# Patient Record
Sex: FEMALE | Race: WHITE | NOT HISPANIC OR LATINO | Employment: FULL TIME | URBAN - NONMETROPOLITAN AREA
[De-identification: names, ages, dates, MRNs, and addresses within clinical notes are randomized per-mention and may not be internally consistent; named-entity substitution may affect disease eponyms.]

---

## 2019-03-21 ENCOUNTER — HOSPITAL ENCOUNTER (OUTPATIENT)
Dept: MAMMOGRAPHY | Facility: HOSPITAL | Age: 39
Discharge: HOME | End: 2019-03-21
Admitting: NURSE PRACTITIONER

## 2019-03-21 ENCOUNTER — HOSPITAL ENCOUNTER (OUTPATIENT)
Dept: ULTRASOUND IMAGING | Facility: HOSPITAL | Age: 39
Discharge: HOME | End: 2019-03-21

## 2019-03-21 DIAGNOSIS — N63.10 LUMP OF RIGHT BREAST: ICD-10-CM

## 2019-03-21 PROCEDURE — 76642 ULTRASOUND BREAST LIMITED: CPT | Performed by: RADIOLOGY

## 2019-03-21 PROCEDURE — 76642 ULTRASOUND BREAST LIMITED: CPT

## 2019-03-21 PROCEDURE — G0279 TOMOSYNTHESIS, MAMMO: HCPCS

## 2019-03-21 PROCEDURE — 77066 DX MAMMO INCL CAD BI: CPT | Performed by: RADIOLOGY

## 2019-03-21 PROCEDURE — 77062 BREAST TOMOSYNTHESIS BI: CPT | Performed by: RADIOLOGY

## 2019-03-21 PROCEDURE — 77066 DX MAMMO INCL CAD BI: CPT

## 2021-01-21 ENCOUNTER — HOSPITAL ENCOUNTER (OUTPATIENT)
Dept: MAMMOGRAPHY | Facility: HOSPITAL | Age: 41
End: 2021-01-21

## 2022-02-11 ENCOUNTER — TELEPHONE (OUTPATIENT)
Dept: UROLOGY | Facility: CLINIC | Age: 42
End: 2022-02-11

## 2022-02-22 ENCOUNTER — OFFICE VISIT (OUTPATIENT)
Dept: GASTROENTEROLOGY | Facility: CLINIC | Age: 42
End: 2022-02-22

## 2022-02-22 VITALS — HEIGHT: 69 IN | WEIGHT: 289.8 LBS | BODY MASS INDEX: 42.92 KG/M2

## 2022-02-22 DIAGNOSIS — R11.0 NAUSEA: ICD-10-CM

## 2022-02-22 DIAGNOSIS — R10.13 EPIGASTRIC PAIN: ICD-10-CM

## 2022-02-22 DIAGNOSIS — R10.84 GENERALIZED ABDOMINAL PAIN: Primary | ICD-10-CM

## 2022-02-22 DIAGNOSIS — R19.7 DIARRHEA, UNSPECIFIED TYPE: ICD-10-CM

## 2022-02-22 PROCEDURE — 99204 OFFICE O/P NEW MOD 45 MIN: CPT | Performed by: PHYSICIAN ASSISTANT

## 2022-02-22 RX ORDER — DICYCLOMINE HYDROCHLORIDE 10 MG/1
10 CAPSULE ORAL
Qty: 120 CAPSULE | Refills: 0 | Status: SHIPPED | OUTPATIENT
Start: 2022-02-22 | End: 2022-06-01

## 2022-02-22 RX ORDER — BISACODYL 5 MG/1
20 TABLET, DELAYED RELEASE ORAL ONCE
Qty: 4 TABLET | Refills: 0 | Status: SHIPPED | OUTPATIENT
Start: 2022-02-22 | End: 2022-02-22

## 2022-02-22 RX ORDER — MAGNESIUM CARB/ALUMINUM HYDROX 105-160MG
296 TABLET,CHEWABLE ORAL TAKE AS DIRECTED
Qty: 592 ML | Refills: 0 | Status: SHIPPED | OUTPATIENT
Start: 2022-02-22 | End: 2022-03-08 | Stop reason: HOSPADM

## 2022-02-22 NOTE — PROGRESS NOTES
Chief Complaint   Patient presents with   • Diarrhea       Genevieve Ness is a 41 y.o. female who presents to the office today for evaluation of Diarrhea  .    HPI  Patient presents to the clinic today for evaluation of chronic diarrhea. Patient states that this has been going on for several months however back in January symptoms seem to increase dramatically. Back in the December she was placed on broad-spectrum antibiotics. They originally thought she had C. difficile however stool studies determined that was negative. Despite the result they treated her with Flagyl for 14 days. Patient states she noticed no difference whatsoever in taking the medication. Patient states that she has a lot of pain in the epigastric region after eating. After eating she will have a bowel movement within 5 minutes. Patient states she can go upwards of 20 times a day. She has lost in total 40 pounds however 20-30 of those pounds were lost in the last month. She has noticed some bright red blood per rectum however contributes that to hemorrhoids and straining. Patient states that her stools are type VII on the [place] stool scale and at times can appear like coffee grounds. She does have quite a bit of nausea throughout the day.  Review of Systems   Constitutional: Negative for fever.   HENT: Negative for trouble swallowing.    Eyes: Negative.    Respiratory: Negative for chest tightness.    Cardiovascular: Negative for chest pain.   Gastrointestinal: Positive for abdominal distention, abdominal pain, anal bleeding, blood in stool, diarrhea and nausea. Negative for constipation and vomiting.   Endocrine: Negative.    Genitourinary: Negative.    Musculoskeletal: Negative.    Skin: Negative.    Allergic/Immunologic: Negative.    Neurological: Positive for headaches.   Hematological: Does not bruise/bleed easily.   Psychiatric/Behavioral: Negative.        ACTIVE PROBLEMS:   Specialty Problems     None          PAST MEDICAL HISTORY:  Past  "Medical History:   Diagnosis Date   • Thyroid disease        SURGICAL HISTORY:  Past Surgical History:   Procedure Laterality Date   •  SECTION         FAMILY HISTORY:  Family History   Problem Relation Age of Onset   • Breast cancer Neg Hx        SOCIAL HISTORY:  Social History     Tobacco Use   • Smoking status: Former Smoker   • Smokeless tobacco: Never Used   Substance Use Topics   • Alcohol use: Never       CURRENT MEDICATION:    Current Outpatient Medications:   •  bisacodyl (DULCOLAX) 5 MG EC tablet, Take 4 tablets by mouth 1 (One) Time for 1 dose. Take 4 tablets at 4:00 PM the day before procedure, Disp: 4 tablet, Rfl: 0  •  dicyclomine (Bentyl) 10 MG capsule, Take 1 capsule by mouth 4 (Four) Times a Day Before Meals & at Bedtime., Disp: 120 capsule, Rfl: 0  •  magnesium citrate 1.745 GM/30ML solution solution, Take 296 mL by mouth Take As Directed for 2 doses. Take one full bottle at 4:00 PM and take second bottle at 10:00 PM., Disp: 592 mL, Rfl: 0    ALLERGIES:  Patient has no known allergies.    VISIT VITALS:  Ht 175.3 cm (69\")   Wt 131 kg (289 lb 12.8 oz)   BMI 42.80 kg/m²   Physical Exam  Constitutional:       General: She is not in acute distress.     Appearance: Normal appearance. She is well-developed.   HENT:      Head: Normocephalic and atraumatic.   Eyes:      Pupils: Pupils are equal, round, and reactive to light.   Cardiovascular:      Rate and Rhythm: Normal rate and regular rhythm.      Heart sounds: Normal heart sounds.   Pulmonary:      Effort: Pulmonary effort is normal. No respiratory distress.      Breath sounds: Normal breath sounds. No wheezing, rhonchi or rales.   Abdominal:      General: Abdomen is flat. Bowel sounds are normal. There is no distension.      Palpations: Abdomen is soft. There is no mass.      Tenderness: There is abdominal tenderness. There is no guarding or rebound.      Hernia: No hernia is present.   Musculoskeletal:         General: No swelling. Normal " range of motion.      Cervical back: Normal range of motion and neck supple.      Right lower leg: No edema.      Left lower leg: No edema.   Skin:     General: Skin is warm and dry.   Neurological:      Mental Status: She is alert and oriented to person, place, and time.   Psychiatric:         Attention and Perception: Attention normal.         Mood and Affect: Mood normal.         Speech: Speech normal.         Behavior: Behavior normal. Behavior is cooperative.         Thought Content: Thought content normal.         Assessment/Plan   Due to the patient's symptoms-I believe we need to start with an EGD/colonoscopy to evaluate causes behind the gastric pain as well as diarrhea. In the meantime while waiting on scopes I will get the patient started on Bentyl 10 mg 4 times daily. Both procedures were thoroughly explained to the patient she voiced understanding and agrees to treatment plan   Diagnosis Plan   1. Generalized abdominal pain  Case Request    Case Request    bisacodyl (DULCOLAX) 5 MG EC tablet    magnesium citrate 1.745 GM/30ML solution solution    dicyclomine (Bentyl) 10 MG capsule   2. Epigastric pain  Case Request    Case Request    bisacodyl (DULCOLAX) 5 MG EC tablet    magnesium citrate 1.745 GM/30ML solution solution   3. Diarrhea, unspecified type  Case Request    Case Request    bisacodyl (DULCOLAX) 5 MG EC tablet    magnesium citrate 1.745 GM/30ML solution solution   4. Nausea         Return After procedure.                   This document has been electronically signed by Charlene Addison PA-C  February 22, 2022 14:57 EST    Part of this note may be an electronic transcription/translation of spoken language to printed text using the Dragon Dictation System.

## 2022-02-23 DIAGNOSIS — R10.84 GENERALIZED ABDOMINAL PAIN: ICD-10-CM

## 2022-02-23 DIAGNOSIS — R11.0 NAUSEA: ICD-10-CM

## 2022-02-23 DIAGNOSIS — R10.13 EPIGASTRIC PAIN: Primary | ICD-10-CM

## 2022-02-23 DIAGNOSIS — R19.7 DIARRHEA, UNSPECIFIED TYPE: ICD-10-CM

## 2022-02-24 ENCOUNTER — TELEPHONE (OUTPATIENT)
Dept: GASTROENTEROLOGY | Facility: CLINIC | Age: 42
End: 2022-02-24

## 2022-02-28 ENCOUNTER — TELEPHONE (OUTPATIENT)
Dept: GASTROENTEROLOGY | Facility: CLINIC | Age: 42
End: 2022-02-28

## 2022-03-01 PROBLEM — R10.84 GENERALIZED ABDOMINAL PAIN: Status: ACTIVE | Noted: 2022-03-01

## 2022-03-01 PROBLEM — R10.13 EPIGASTRIC PAIN: Status: ACTIVE | Noted: 2022-03-01

## 2022-03-01 PROBLEM — R19.7 DIARRHEA: Status: ACTIVE | Noted: 2022-03-01

## 2022-03-04 ENCOUNTER — APPOINTMENT (OUTPATIENT)
Dept: MAMMOGRAPHY | Facility: HOSPITAL | Age: 42
End: 2022-03-04

## 2022-03-04 ENCOUNTER — LAB (OUTPATIENT)
Dept: LAB | Facility: HOSPITAL | Age: 42
End: 2022-03-04

## 2022-03-04 DIAGNOSIS — R19.7 DIARRHEA, UNSPECIFIED TYPE: ICD-10-CM

## 2022-03-04 DIAGNOSIS — R10.84 GENERALIZED ABDOMINAL PAIN: ICD-10-CM

## 2022-03-04 DIAGNOSIS — R11.0 NAUSEA: ICD-10-CM

## 2022-03-04 DIAGNOSIS — R10.13 EPIGASTRIC PAIN: ICD-10-CM

## 2022-03-04 PROCEDURE — C9803 HOPD COVID-19 SPEC COLLECT: HCPCS

## 2022-03-04 PROCEDURE — U0004 COV-19 TEST NON-CDC HGH THRU: HCPCS | Performed by: PHYSICIAN ASSISTANT

## 2022-03-05 LAB — SARS-COV-2 RNA PNL SPEC NAA+PROBE: NOT DETECTED

## 2022-03-08 ENCOUNTER — ANESTHESIA (OUTPATIENT)
Dept: PERIOP | Facility: HOSPITAL | Age: 42
End: 2022-03-08

## 2022-03-08 ENCOUNTER — ANESTHESIA EVENT (OUTPATIENT)
Dept: PERIOP | Facility: HOSPITAL | Age: 42
End: 2022-03-08

## 2022-03-08 ENCOUNTER — HOSPITAL ENCOUNTER (OUTPATIENT)
Facility: HOSPITAL | Age: 42
Setting detail: HOSPITAL OUTPATIENT SURGERY
Discharge: HOME | End: 2022-03-08
Attending: INTERNAL MEDICINE | Admitting: INTERNAL MEDICINE

## 2022-03-08 VITALS
DIASTOLIC BLOOD PRESSURE: 91 MMHG | HEIGHT: 69 IN | TEMPERATURE: 98 F | BODY MASS INDEX: 42.06 KG/M2 | RESPIRATION RATE: 16 BRPM | OXYGEN SATURATION: 100 % | HEART RATE: 77 BPM | SYSTOLIC BLOOD PRESSURE: 131 MMHG | WEIGHT: 284 LBS

## 2022-03-08 DIAGNOSIS — R19.7 DIARRHEA, UNSPECIFIED TYPE: ICD-10-CM

## 2022-03-08 DIAGNOSIS — K51.011 ULCERATIVE PANCOLITIS WITH RECTAL BLEEDING: Primary | ICD-10-CM

## 2022-03-08 DIAGNOSIS — R10.84 GENERALIZED ABDOMINAL PAIN: ICD-10-CM

## 2022-03-08 DIAGNOSIS — R10.13 EPIGASTRIC PAIN: ICD-10-CM

## 2022-03-08 LAB
B-HCG UR QL: NEGATIVE
EXPIRATION DATE: NORMAL
INTERNAL NEGATIVE CONTROL: NEGATIVE
INTERNAL POSITIVE CONTROL: POSITIVE
Lab: NORMAL

## 2022-03-08 PROCEDURE — 84466 ASSAY OF TRANSFERRIN: CPT | Performed by: INTERNAL MEDICINE

## 2022-03-08 PROCEDURE — 83540 ASSAY OF IRON: CPT | Performed by: INTERNAL MEDICINE

## 2022-03-08 PROCEDURE — 25010000002 PROPOFOL 10 MG/ML EMULSION: Performed by: NURSE ANESTHETIST, CERTIFIED REGISTERED

## 2022-03-08 PROCEDURE — 86140 C-REACTIVE PROTEIN: CPT | Performed by: INTERNAL MEDICINE

## 2022-03-08 PROCEDURE — 45380 COLONOSCOPY AND BIOPSY: CPT | Performed by: INTERNAL MEDICINE

## 2022-03-08 PROCEDURE — 85007 BL SMEAR W/DIFF WBC COUNT: CPT | Performed by: INTERNAL MEDICINE

## 2022-03-08 PROCEDURE — 85025 COMPLETE CBC W/AUTO DIFF WBC: CPT | Performed by: INTERNAL MEDICINE

## 2022-03-08 PROCEDURE — 43239 EGD BIOPSY SINGLE/MULTIPLE: CPT | Performed by: INTERNAL MEDICINE

## 2022-03-08 PROCEDURE — 81025 URINE PREGNANCY TEST: CPT | Performed by: ANESTHESIOLOGY

## 2022-03-08 PROCEDURE — 25010000002 FENTANYL CITRATE (PF) 50 MCG/ML SOLUTION: Performed by: NURSE ANESTHETIST, CERTIFIED REGISTERED

## 2022-03-08 PROCEDURE — 80053 COMPREHEN METABOLIC PANEL: CPT | Performed by: INTERNAL MEDICINE

## 2022-03-08 PROCEDURE — 25010000002 KETOROLAC TROMETHAMINE PER 15 MG: Performed by: NURSE ANESTHETIST, CERTIFIED REGISTERED

## 2022-03-08 PROCEDURE — 36415 COLL VENOUS BLD VENIPUNCTURE: CPT | Performed by: INTERNAL MEDICINE

## 2022-03-08 RX ORDER — OXYCODONE HYDROCHLORIDE AND ACETAMINOPHEN 5; 325 MG/1; MG/1
1 TABLET ORAL ONCE AS NEEDED
Status: DISCONTINUED | OUTPATIENT
Start: 2022-03-08 | End: 2022-03-08 | Stop reason: HOSPADM

## 2022-03-08 RX ORDER — DROPERIDOL 2.5 MG/ML
0.62 INJECTION, SOLUTION INTRAMUSCULAR; INTRAVENOUS ONCE AS NEEDED
Status: DISCONTINUED | OUTPATIENT
Start: 2022-03-08 | End: 2022-03-08 | Stop reason: HOSPADM

## 2022-03-08 RX ORDER — PREDNISONE 1 MG/1
TABLET ORAL
Qty: 109 TABLET | Refills: 0 | Status: SHIPPED | OUTPATIENT
Start: 2022-03-08 | End: 2022-03-31 | Stop reason: SDUPTHER

## 2022-03-08 RX ORDER — KETOROLAC TROMETHAMINE 30 MG/ML
30 INJECTION, SOLUTION INTRAMUSCULAR; INTRAVENOUS EVERY 6 HOURS PRN
Status: DISCONTINUED | OUTPATIENT
Start: 2022-03-08 | End: 2022-03-08 | Stop reason: HOSPADM

## 2022-03-08 RX ORDER — MESALAMINE 4 G/60ML
4 ENEMA RECTAL SEE ADMIN INSTRUCTIONS
Qty: 30 ENEMA | Refills: 2 | Status: SHIPPED | OUTPATIENT
Start: 2022-03-08 | End: 2022-12-21

## 2022-03-08 RX ORDER — IPRATROPIUM BROMIDE AND ALBUTEROL SULFATE 2.5; .5 MG/3ML; MG/3ML
3 SOLUTION RESPIRATORY (INHALATION) ONCE AS NEEDED
Status: DISCONTINUED | OUTPATIENT
Start: 2022-03-08 | End: 2022-03-08 | Stop reason: HOSPADM

## 2022-03-08 RX ORDER — ONDANSETRON 2 MG/ML
4 INJECTION INTRAMUSCULAR; INTRAVENOUS AS NEEDED
Status: DISCONTINUED | OUTPATIENT
Start: 2022-03-08 | End: 2022-03-08 | Stop reason: HOSPADM

## 2022-03-08 RX ORDER — SODIUM CHLORIDE, SODIUM LACTATE, POTASSIUM CHLORIDE, CALCIUM CHLORIDE 600; 310; 30; 20 MG/100ML; MG/100ML; MG/100ML; MG/100ML
INJECTION, SOLUTION INTRAVENOUS CONTINUOUS PRN
Status: DISCONTINUED | OUTPATIENT
Start: 2022-03-08 | End: 2022-03-08 | Stop reason: SURG

## 2022-03-08 RX ORDER — MEPERIDINE HYDROCHLORIDE 25 MG/ML
12.5 INJECTION INTRAMUSCULAR; INTRAVENOUS; SUBCUTANEOUS
Status: DISCONTINUED | OUTPATIENT
Start: 2022-03-08 | End: 2022-03-08 | Stop reason: HOSPADM

## 2022-03-08 RX ORDER — MESALAMINE 1.2 G/1
TABLET, DELAYED RELEASE ORAL
Qty: 120 TABLET | Refills: 11 | Status: SHIPPED | OUTPATIENT
Start: 2022-03-08 | End: 2023-03-31

## 2022-03-08 RX ORDER — LIDOCAINE HYDROCHLORIDE 20 MG/ML
INJECTION, SOLUTION INFILTRATION; PERINEURAL AS NEEDED
Status: DISCONTINUED | OUTPATIENT
Start: 2022-03-08 | End: 2022-03-08 | Stop reason: SURG

## 2022-03-08 RX ORDER — SODIUM CHLORIDE, SODIUM LACTATE, POTASSIUM CHLORIDE, CALCIUM CHLORIDE 600; 310; 30; 20 MG/100ML; MG/100ML; MG/100ML; MG/100ML
100 INJECTION, SOLUTION INTRAVENOUS ONCE AS NEEDED
Status: DISCONTINUED | OUTPATIENT
Start: 2022-03-08 | End: 2022-03-08 | Stop reason: HOSPADM

## 2022-03-08 RX ORDER — PROPOFOL 10 MG/ML
VIAL (ML) INTRAVENOUS AS NEEDED
Status: DISCONTINUED | OUTPATIENT
Start: 2022-03-08 | End: 2022-03-08 | Stop reason: SURG

## 2022-03-08 RX ORDER — FENTANYL CITRATE 50 UG/ML
50 INJECTION, SOLUTION INTRAMUSCULAR; INTRAVENOUS
Status: DISCONTINUED | OUTPATIENT
Start: 2022-03-08 | End: 2022-03-08 | Stop reason: HOSPADM

## 2022-03-08 RX ADMIN — PROPOFOL 200 MCG/KG/MIN: 10 INJECTION, EMULSION INTRAVENOUS at 12:36

## 2022-03-08 RX ADMIN — SODIUM CHLORIDE, POTASSIUM CHLORIDE, SODIUM LACTATE AND CALCIUM CHLORIDE: 600; 310; 30; 20 INJECTION, SOLUTION INTRAVENOUS at 12:36

## 2022-03-08 RX ADMIN — FENTANYL CITRATE 50 MCG: 50 INJECTION INTRAMUSCULAR; INTRAVENOUS at 13:19

## 2022-03-08 RX ADMIN — LIDOCAINE HYDROCHLORIDE 60 MG: 20 INJECTION, SOLUTION INFILTRATION; PERINEURAL at 12:36

## 2022-03-08 RX ADMIN — PROPOFOL 60 MG: 10 INJECTION, EMULSION INTRAVENOUS at 12:36

## 2022-03-08 NOTE — ANESTHESIA PREPROCEDURE EVALUATION
Anesthesia Evaluation     Patient summary reviewed and Nursing notes reviewed   no history of anesthetic complications:  NPO Solid Status: > 8 hours  NPO Liquid Status: > 8 hours           Airway   Mallampati: III  TM distance: >3 FB  Neck ROM: full  Possible difficult intubation  Dental - normal exam     Pulmonary - normal exam    breath sounds clear to auscultation  (+) a smoker (vap) Current Abstained day of surgery,   Cardiovascular - negative cardio ROS and normal exam    Rhythm: regular  Rate: normal        Neuro/Psych- negative ROS  GI/Hepatic/Renal/Endo    (+) obesity, morbid obesity,  thyroid problem     Musculoskeletal (-) negative ROS    Abdominal   (+) obese,    Substance History - negative use     OB/GYN negative ob/gyn ROS         Other - negative ROS           Phys Exam Other: Mr. Ness acts very sleepy and slightly slurs her speech.  She denies any drug use.                 Anesthesia Plan    ASA 3     general   total IV anesthesia(Classs III morbid obesity. )  intravenous induction     Anesthetic plan, all risks, benefits, and alternatives have been provided, discussed and informed consent has been obtained with: patient.    Plan discussed with CRNA.        CODE STATUS:

## 2022-03-08 NOTE — ANESTHESIA POSTPROCEDURE EVALUATION
Patient: Genevieve Ness    Procedure Summary     Date: 03/08/22 Room / Location:  [facility]  /  [facility]    Anesthesia Start: 1232 Anesthesia Stop: 1304    Procedures:       ESOPHAGOGASTRODUODENOSCOPY WITH BIOPSY (N/A Esophagus)      COLONOSCOPY FOR SCREENING (N/A ) Diagnosis:       Epigastric pain      Diarrhea, unspecified type      Generalized abdominal pain      (Epigastric pain [R10.13])      (Diarrhea, unspecified type [R19.7])      (Generalized abdominal pain [R10.84])    Surgeons: Vera Lawton MD Provider: Lan Stover DO    Anesthesia Type: general ASA Status: 3          Anesthesia Type: general    Vitals  Vitals Value Taken Time   /91 03/08/22 1349   Temp 98 °F (36.7 °C) 03/08/22 1306   Pulse 77 03/08/22 1349   Resp 16 03/08/22 1349   SpO2 100 % 03/08/22 1349           Post Anesthesia Care and Evaluation    Patient location during evaluation: PHASE II  Patient participation: complete - patient participated  Level of consciousness: awake and alert  Pain score: 0  Pain management: adequate  Airway patency: patent  Anesthetic complications: No anesthetic complications  PONV Status: controlled  Cardiovascular status: acceptable  Respiratory status: acceptable and room air  Hydration status: euvolemic  No anesthesia care post op

## 2022-03-09 PROBLEM — D50.0 IRON DEFICIENCY ANEMIA DUE TO CHRONIC BLOOD LOSS: Status: ACTIVE | Noted: 2022-03-09

## 2022-03-09 PROBLEM — K90.9 IRON MALABSORPTION: Status: ACTIVE | Noted: 2022-03-09

## 2022-03-09 LAB
ALBUMIN SERPL-MCNC: 3.1 G/DL (ref 3.5–5.2)
ALBUMIN/GLOB SERPL: 0.9 G/DL
ALP SERPL-CCNC: 69 U/L (ref 39–117)
ALT SERPL W P-5'-P-CCNC: 30 U/L (ref 1–33)
ANION GAP SERPL CALCULATED.3IONS-SCNC: 12.9 MMOL/L (ref 5–15)
ANISOCYTOSIS BLD QL: NORMAL
AST SERPL-CCNC: 28 U/L (ref 1–32)
BILIRUB SERPL-MCNC: 0.2 MG/DL (ref 0–1.2)
BUN SERPL-MCNC: 7 MG/DL (ref 6–20)
BUN/CREAT SERPL: 8 (ref 7–25)
CALCIUM SPEC-SCNC: 8.6 MG/DL (ref 8.6–10.5)
CHLORIDE SERPL-SCNC: 102 MMOL/L (ref 98–107)
CO2 SERPL-SCNC: 23.1 MMOL/L (ref 22–29)
CREAT SERPL-MCNC: 0.87 MG/DL (ref 0.57–1)
CRP SERPL-MCNC: 2.64 MG/DL (ref 0–0.5)
DEPRECATED RDW RBC AUTO: 56.6 FL (ref 37–54)
EGFRCR SERPLBLD CKD-EPI 2021: 86 ML/MIN/1.73
EOSINOPHIL # BLD MANUAL: 0.18 10*3/MM3 (ref 0–0.4)
EOSINOPHIL NFR BLD MANUAL: 3.3 % (ref 0.3–6.2)
ERYTHROCYTE [DISTWIDTH] IN BLOOD BY AUTOMATED COUNT: 18.2 % (ref 12.3–15.4)
GLOBULIN UR ELPH-MCNC: 3.6 GM/DL
GLUCOSE SERPL-MCNC: 99 MG/DL (ref 65–99)
HCT VFR BLD AUTO: 31.2 % (ref 34–46.6)
HGB BLD-MCNC: 9.3 G/DL (ref 12–15.9)
HYPOCHROMIA BLD QL: NORMAL
IRON 24H UR-MRATE: 16 MCG/DL (ref 37–145)
IRON SATN MFR SERPL: 5 % (ref 20–50)
LYMPHOCYTES # BLD MANUAL: 1.3 10*3/MM3 (ref 0.7–3.1)
LYMPHOCYTES NFR BLD MANUAL: 6.5 % (ref 5–12)
MCH RBC QN AUTO: 25.5 PG (ref 26.6–33)
MCHC RBC AUTO-ENTMCNC: 29.8 G/DL (ref 31.5–35.7)
MCV RBC AUTO: 85.5 FL (ref 79–97)
MICROCYTES BLD QL: NORMAL
MONOCYTES # BLD: 0.35 10*3/MM3 (ref 0.1–0.9)
NEUTROPHILS # BLD AUTO: 3.61 10*3/MM3 (ref 1.7–7)
NEUTROPHILS NFR BLD MANUAL: 66.3 % (ref 42.7–76)
NRBC BLD AUTO-RTO: 0 /100 WBC (ref 0–0.2)
PLAT MORPH BLD: NORMAL
PLATELET # BLD AUTO: 328 10*3/MM3 (ref 140–450)
PMV BLD AUTO: 11.9 FL (ref 6–12)
POTASSIUM SERPL-SCNC: 4.2 MMOL/L (ref 3.5–5.2)
PROT SERPL-MCNC: 6.7 G/DL (ref 6–8.5)
RBC # BLD AUTO: 3.65 10*6/MM3 (ref 3.77–5.28)
SODIUM SERPL-SCNC: 138 MMOL/L (ref 136–145)
TIBC SERPL-MCNC: 334 MCG/DL (ref 298–536)
TRANSFERRIN SERPL-MCNC: 224 MG/DL (ref 200–360)
VARIANT LYMPHS NFR BLD MANUAL: 2.2 % (ref 0–5)
VARIANT LYMPHS NFR BLD MANUAL: 21.7 % (ref 19.6–45.3)
WBC MORPH BLD: NORMAL
WBC NRBC COR # BLD: 5.45 10*3/MM3 (ref 3.4–10.8)

## 2022-03-09 NOTE — PROGRESS NOTES
Yesterday I drew labs to check for iron deficiency anemia due to your history of rectal bleeding.  You are very iron deficient.  I am going to arrange for IV iron replacement.  This should make you feel much better.  I am going to give IV infusions due to your high intolerance of oral iron.

## 2022-03-11 LAB
LAB AP CASE REPORT: NORMAL
PATH REPORT.FINAL DX SPEC: NORMAL

## 2022-03-14 ENCOUNTER — INFUSION (OUTPATIENT)
Dept: ONCOLOGY | Facility: HOSPITAL | Age: 42
End: 2022-03-14

## 2022-03-14 VITALS
HEART RATE: 76 BPM | RESPIRATION RATE: 18 BRPM | SYSTOLIC BLOOD PRESSURE: 137 MMHG | DIASTOLIC BLOOD PRESSURE: 85 MMHG | BODY MASS INDEX: 43.55 KG/M2 | OXYGEN SATURATION: 100 % | TEMPERATURE: 97.3 F | WEIGHT: 293 LBS

## 2022-03-14 DIAGNOSIS — D50.0 IRON DEFICIENCY ANEMIA DUE TO CHRONIC BLOOD LOSS: Primary | ICD-10-CM

## 2022-03-14 DIAGNOSIS — K90.9 IRON MALABSORPTION: ICD-10-CM

## 2022-03-14 PROCEDURE — 96365 THER/PROPH/DIAG IV INF INIT: CPT

## 2022-03-14 PROCEDURE — 96366 THER/PROPH/DIAG IV INF ADDON: CPT

## 2022-03-14 PROCEDURE — 25010000002 IRON SUCROSE PER 1 MG: Performed by: INTERNAL MEDICINE

## 2022-03-14 RX ADMIN — IRON SUCROSE 300 MG: 20 INJECTION, SOLUTION INTRAVENOUS at 11:45

## 2022-03-15 ENCOUNTER — INFUSION (OUTPATIENT)
Dept: ONCOLOGY | Facility: HOSPITAL | Age: 42
End: 2022-03-15

## 2022-03-15 VITALS
RESPIRATION RATE: 18 BRPM | TEMPERATURE: 98.2 F | DIASTOLIC BLOOD PRESSURE: 71 MMHG | BODY MASS INDEX: 43.12 KG/M2 | WEIGHT: 292 LBS | HEART RATE: 93 BPM | OXYGEN SATURATION: 99 % | SYSTOLIC BLOOD PRESSURE: 128 MMHG

## 2022-03-15 DIAGNOSIS — K90.9 IRON MALABSORPTION: ICD-10-CM

## 2022-03-15 DIAGNOSIS — D50.0 IRON DEFICIENCY ANEMIA DUE TO CHRONIC BLOOD LOSS: Primary | ICD-10-CM

## 2022-03-15 PROBLEM — K51.90 ULCERATIVE COLITIS: Status: ACTIVE | Noted: 2022-03-15

## 2022-03-15 PROCEDURE — 96365 THER/PROPH/DIAG IV INF INIT: CPT

## 2022-03-15 PROCEDURE — 25010000002 IRON SUCROSE PER 1 MG: Performed by: INTERNAL MEDICINE

## 2022-03-15 RX ADMIN — IRON SUCROSE 300 MG: 20 INJECTION, SOLUTION INTRAVENOUS at 15:24

## 2022-03-15 NOTE — PROGRESS NOTES
At the time of your recent colonoscopy, biopsies were taken randomly of the colon.  Biopsies revealed chronic active colitis.  We have started you on medication for this.  You also had a polyp that because of the amount of inflammation in your colon I could not tell if it was an inflammatory polyp versus a precancerous polyp.  Biopsies revealed this to be a precancerous polyp.  We will repeat your colonoscopy before the end of the year to remove that polyp.  However, I would like to get your ulcerative colitis under good control prior to doing so.  I will set you up for colonoscopy for the end of summer.  Biopsies of the esophagus obtained during your upper endoscopy revealed reflux esophagitis.  However, this may be secondary to the persistent nausea and vomiting that you experienced due to colitis.  We will hold off on putting you on a medication for this at this time to see how you do with treatment of the ulcerative colitis.  Please keep your follow-up appointments.

## 2022-03-16 ENCOUNTER — INFUSION (OUTPATIENT)
Dept: ONCOLOGY | Facility: HOSPITAL | Age: 42
End: 2022-03-16

## 2022-03-16 VITALS
BODY MASS INDEX: 43.71 KG/M2 | HEART RATE: 75 BPM | SYSTOLIC BLOOD PRESSURE: 123 MMHG | WEIGHT: 293 LBS | DIASTOLIC BLOOD PRESSURE: 76 MMHG | TEMPERATURE: 97.2 F | OXYGEN SATURATION: 99 % | RESPIRATION RATE: 18 BRPM

## 2022-03-16 DIAGNOSIS — D50.0 IRON DEFICIENCY ANEMIA DUE TO CHRONIC BLOOD LOSS: Primary | ICD-10-CM

## 2022-03-16 DIAGNOSIS — K90.9 IRON MALABSORPTION: ICD-10-CM

## 2022-03-16 PROCEDURE — 96365 THER/PROPH/DIAG IV INF INIT: CPT

## 2022-03-16 PROCEDURE — 96366 THER/PROPH/DIAG IV INF ADDON: CPT

## 2022-03-16 PROCEDURE — 25010000002 IRON SUCROSE PER 1 MG: Performed by: INTERNAL MEDICINE

## 2022-03-16 RX ADMIN — IRON SUCROSE 300 MG: 20 INJECTION, SOLUTION INTRAVENOUS at 08:26

## 2022-03-29 ENCOUNTER — SPECIALTY PHARMACY (OUTPATIENT)
Dept: PHARMACY | Facility: HOSPITAL | Age: 42
End: 2022-03-29

## 2022-03-29 RX ORDER — ADALIMUMAB 40MG/0.4ML
40 KIT SUBCUTANEOUS
Qty: 2 EACH | Refills: 4 | Status: SHIPPED | OUTPATIENT
Start: 2022-03-29 | End: 2022-06-01

## 2022-03-31 ENCOUNTER — OFFICE VISIT (OUTPATIENT)
Dept: GASTROENTEROLOGY | Facility: CLINIC | Age: 42
End: 2022-03-31

## 2022-03-31 VITALS
WEIGHT: 291 LBS | HEIGHT: 69 IN | DIASTOLIC BLOOD PRESSURE: 87 MMHG | OXYGEN SATURATION: 98 % | BODY MASS INDEX: 43.1 KG/M2 | HEART RATE: 81 BPM | SYSTOLIC BLOOD PRESSURE: 141 MMHG

## 2022-03-31 DIAGNOSIS — K51.919 ULCERATIVE COLITIS WITH COMPLICATION, UNSPECIFIED LOCATION: Primary | ICD-10-CM

## 2022-03-31 PROCEDURE — 99213 OFFICE O/P EST LOW 20 MIN: CPT | Performed by: PHYSICIAN ASSISTANT

## 2022-03-31 RX ORDER — PREDNISONE 10 MG/1
TABLET ORAL
Qty: 7 TABLET | Refills: 0 | Status: SHIPPED | OUTPATIENT
Start: 2022-03-31 | End: 2022-04-26

## 2022-04-04 ENCOUNTER — TELEPHONE (OUTPATIENT)
Dept: UROLOGY | Facility: CLINIC | Age: 42
End: 2022-04-04

## 2022-04-04 NOTE — TELEPHONE ENCOUNTER
The patient called and said her Humira has been approved and she wanted to know if that was okay for her to go ahead and take it. It was denied at first and was working on Entyvio.

## 2022-04-05 ENCOUNTER — TELEPHONE (OUTPATIENT)
Dept: GASTROENTEROLOGY | Facility: CLINIC | Age: 42
End: 2022-04-05

## 2022-04-05 NOTE — TELEPHONE ENCOUNTER
Called and spoke to patient, it is ok to start the Humira on day 1 2 x 80mg shots, day 15 1 x 80mg shot day 30 and after 40mg shot, I told patient once she receives the medication if she wants to call her and I will go over the instructions and see if they are the same as the box she could

## 2022-04-13 ENCOUNTER — TELEPHONE (OUTPATIENT)
Dept: GASTROENTEROLOGY | Facility: CLINIC | Age: 42
End: 2022-04-13

## 2022-04-25 ENCOUNTER — TELEPHONE (OUTPATIENT)
Dept: GASTROENTEROLOGY | Facility: CLINIC | Age: 42
End: 2022-04-25

## 2022-04-25 NOTE — TELEPHONE ENCOUNTER
Patient said she can not tell if humira is working, but after coming off of steroid she is worse. She has been vomiting with diarrhea all weekend. She wanting to know if she can try something else.

## 2022-04-26 ENCOUNTER — LAB (OUTPATIENT)
Dept: LAB | Facility: HOSPITAL | Age: 42
End: 2022-04-26

## 2022-04-26 ENCOUNTER — OFFICE VISIT (OUTPATIENT)
Dept: GASTROENTEROLOGY | Facility: CLINIC | Age: 42
End: 2022-04-26

## 2022-04-26 VITALS
BODY MASS INDEX: 42.21 KG/M2 | SYSTOLIC BLOOD PRESSURE: 166 MMHG | OXYGEN SATURATION: 96 % | WEIGHT: 285 LBS | DIASTOLIC BLOOD PRESSURE: 95 MMHG | HEART RATE: 78 BPM | HEIGHT: 69 IN

## 2022-04-26 DIAGNOSIS — K51.919 ULCERATIVE COLITIS WITH COMPLICATION, UNSPECIFIED LOCATION: ICD-10-CM

## 2022-04-26 DIAGNOSIS — K51.919 ULCERATIVE COLITIS WITH COMPLICATION, UNSPECIFIED LOCATION: Primary | ICD-10-CM

## 2022-04-26 PROCEDURE — 83993 ASSAY FOR CALPROTECTIN FECAL: CPT

## 2022-04-26 PROCEDURE — 99214 OFFICE O/P EST MOD 30 MIN: CPT | Performed by: PHYSICIAN ASSISTANT

## 2022-04-26 RX ORDER — PREDNISONE 10 MG/1
10 TABLET ORAL TAKE AS DIRECTED
Qty: 73 TABLET | Refills: 0 | Status: SHIPPED | OUTPATIENT
Start: 2022-04-26 | End: 2022-06-27 | Stop reason: SDUPTHER

## 2022-04-26 NOTE — PROGRESS NOTES
Chief Complaint   Patient presents with   • Ulcerative Colitis       Genevieve Ness is a 41 y.o. female who presents to the office today for evaluation of Ulcerative Colitis  .    HPI  Patient presents the clinic today for follow-up of ulcerative colitis.  Patient has not been seen in clinic since she started her Humira-she has taken all of her loading doses and has not initiated 40 mg biweekly.  Patient however has noted a significant increase/worsening in symptoms.  She is having severe abdominal pain that occurs almost constantly throughout the day without any alleviating or aggravating factors.  She is also experiencing an increase in diarrhea as well that she has had 12-14 bowel movements already this morning.  Most of her bowel movements are bloody in nature.  Since the severe abdominal pain has started patient has also been having vomiting episodes periodically.  She feels like she is only getting worse since starting the Humira.  She had previously been doing well while on a steroid taper pack.  She is roughly 6 weeks into treatment with Humira and has had no symptom resolution.  Patient states that she just feels extremely sick and not well.  At times it is hard for her to even do normal daily activities due to the pain.    Review of Systems   Constitutional: Negative for fever.   HENT: Negative for trouble swallowing.    Eyes: Negative.    Respiratory: Negative for chest tightness.    Cardiovascular: Negative for chest pain.   Gastrointestinal: Positive for abdominal distention, abdominal pain, anal bleeding, blood in stool, diarrhea and nausea. Negative for constipation and vomiting.   Endocrine: Negative.    Genitourinary: Negative.    Musculoskeletal: Negative.    Skin: Negative.    Allergic/Immunologic: Negative.    Neurological: Positive for headaches.   Hematological: Does not bruise/bleed easily.   Psychiatric/Behavioral: Negative.        ACTIVE PROBLEMS:   Specialty Problems        Gastroenterology  Problems    Iron malabsorption        Ulcerative colitis (HCC)              PAST MEDICAL HISTORY:  Past Medical History:   Diagnosis Date   • Thyroid disease        SURGICAL HISTORY:  Past Surgical History:   Procedure Laterality Date   •  SECTION     • COLONOSCOPY N/A 3/8/2022    Procedure: COLONOSCOPY FOR SCREENING;  Surgeon: Vera Lawton MD;  Location: [facility];  Service: Gastroenterology;  Laterality: N/A;   • ENDOSCOPY N/A 3/8/2022    Procedure: ESOPHAGOGASTRODUODENOSCOPY WITH BIOPSY;  Surgeon: Vera Lawton MD;  Location: [facility];  Service: Gastroenterology;  Laterality: N/A;   • WISDOM TOOTH EXTRACTION         FAMILY HISTORY:  Family History   Problem Relation Age of Onset   • Breast cancer Neg Hx        SOCIAL HISTORY:  Social History     Tobacco Use   • Smoking status: Former Smoker   • Smokeless tobacco: Never Used   Substance Use Topics   • Alcohol use: Never       CURRENT MEDICATION:    Current Outpatient Medications:   •  Adalimumab (Humira Pen) 40 MG/0.4ML Pen-injector Kit, Inject 40 mg under the skin into the appropriate area as directed Every 14 (Fourteen) Days., Disp: 2 each, Rfl: 4  •  Adalimumab (HUMIRA) 80 MG/0.8ML injection, Inject 1.6 mL under the skin into the appropriate area as directed Take As Directed. Once, then 0.8 mL 14 days later, Disp: 2.4 mL, Rfl: 0  •  dicyclomine (Bentyl) 10 MG capsule, Take 1 capsule by mouth 4 (Four) Times a Day Before Meals & at Bedtime., Disp: 120 capsule, Rfl: 0  •  mesalamine (Lialda) 1.2 g EC tablet, Take four tablets by mouth all at one time in the morning, Disp: 120 tablet, Rfl: 11  •  mesalamine (ROWASA) 4 g enema, Insert 1 enema into the rectum See Admin Instructions. One enema in rectum nightly, try and retain overnight, Disp: 30 enema, Rfl: 2  •  predniSONE (DELTASONE) 10 MG tablet, Take 1 tablet by mouth Take As Directed. Take 40mg x 7 days, 30mg x 7 days, 20mg x 7 days, 10mg x 7 days, and 5mg x 6 days, Disp:  "73 tablet, Rfl: 0    ALLERGIES:  Patient has no known allergies.    VISIT VITALS:  /95   Pulse 78   Ht 175.3 cm (69\")   Wt 129 kg (285 lb)   SpO2 96%   BMI 42.09 kg/m²   Physical Exam  Constitutional:       General: She is not in acute distress.     Appearance: Normal appearance. She is well-developed.   HENT:      Head: Normocephalic and atraumatic.   Eyes:      Pupils: Pupils are equal, round, and reactive to light.   Cardiovascular:      Rate and Rhythm: Normal rate and regular rhythm.      Heart sounds: Normal heart sounds.   Pulmonary:      Effort: Pulmonary effort is normal. No respiratory distress.      Breath sounds: Normal breath sounds. No wheezing, rhonchi or rales.   Abdominal:      General: Abdomen is flat. Bowel sounds are normal. There is no distension.      Palpations: Abdomen is soft. There is no mass.      Tenderness: There is no abdominal tenderness. There is no guarding or rebound.      Hernia: No hernia is present.   Musculoskeletal:         General: No swelling. Normal range of motion.      Cervical back: Normal range of motion and neck supple.      Right lower leg: No edema.      Left lower leg: No edema.   Skin:     General: Skin is warm and dry.   Neurological:      Mental Status: She is alert and oriented to person, place, and time.   Psychiatric:         Attention and Perception: Attention normal.         Mood and Affect: Mood normal.         Speech: Speech normal.         Behavior: Behavior normal. Behavior is cooperative.         Thought Content: Thought content normal.           Assessment/Plan   Due to the patient's symptoms worsening while on Humira- I will speak to our specialty pharmacy to see if they can rerun Entyvio on her insurance plan for approval.  She has failed Humira.  Patient's symptoms continue to worsen while on the medication.  In the meantime I will go ahead and start her on a prednisone taper pack starting at 40 mg for 7 days and decreasing down to 5mg " over the period of several weeks.  Entyvio connect paperwork was also sent into the company as well.  We will also be obtaining a baseline fecal calprotectin.   Diagnosis Plan   1. Ulcerative colitis with complication, unspecified location (HCC)  predniSONE (DELTASONE) 10 MG tablet    Calprotectin, Fecal - Stool, Per Rectum       Return in about 6 weeks (around 6/7/2022).                   This document has been electronically signed by Charlene Addison PA-C  April 26, 2022 13:06 EDT    Part of this note may be an electronic transcription/translation of spoken language to printed text using the Dragon Dictation System.

## 2022-04-26 NOTE — TELEPHONE ENCOUNTER
Patient seen in clinic-we are changing her medication and starting her on a steroid taper currently

## 2022-04-27 ENCOUNTER — TELEPHONE (OUTPATIENT)
Dept: UROLOGY | Facility: CLINIC | Age: 42
End: 2022-04-27

## 2022-04-27 NOTE — TELEPHONE ENCOUNTER
Petar from [facility] needs you to call him back concerning missing information on the entyvio application

## 2022-04-29 LAB — CALPROTECTIN STL-MCNT: 2009 UG/G (ref 0–120)

## 2022-05-11 ENCOUNTER — INFUSION (OUTPATIENT)
Dept: ONCOLOGY | Facility: HOSPITAL | Age: 42
End: 2022-05-11

## 2022-05-11 VITALS
RESPIRATION RATE: 18 BRPM | DIASTOLIC BLOOD PRESSURE: 59 MMHG | OXYGEN SATURATION: 98 % | HEART RATE: 58 BPM | SYSTOLIC BLOOD PRESSURE: 111 MMHG | TEMPERATURE: 98.2 F

## 2022-05-11 DIAGNOSIS — K51.919 ULCERATIVE COLITIS WITH COMPLICATION, UNSPECIFIED LOCATION: Primary | ICD-10-CM

## 2022-05-11 PROCEDURE — 25010000002 VEDOLIZUMAB 300 MG RECONSTITUTED SOLUTION 1 EACH VIAL: Performed by: INTERNAL MEDICINE

## 2022-05-11 PROCEDURE — 96365 THER/PROPH/DIAG IV INF INIT: CPT

## 2022-05-11 RX ADMIN — VEDOLIZUMAB 300 MG: 300 INJECTION, POWDER, LYOPHILIZED, FOR SOLUTION INTRAVENOUS at 14:53

## 2022-05-16 ENCOUNTER — TELEPHONE (OUTPATIENT)
Dept: GASTROENTEROLOGY | Facility: CLINIC | Age: 42
End: 2022-05-16

## 2022-05-16 NOTE — TELEPHONE ENCOUNTER
Spoke with patient and let her know information. She voiced understanding. She said that her stomach is starting to feel better.

## 2022-05-16 NOTE — TELEPHONE ENCOUNTER
----- Message from Charlene Addison PA-C sent at 5/10/2022  8:46 AM EDT -----  Can you contact Genevieve and let her know her fecal calprotectin level was extremely high which does verify that the Humira was not working like it was supposed to.  It looks like she has an appointment at the infusion clinic tomorrow-does have her call and let us know if symptoms do not seem to be getting better within the next 2 to 3 weeks after infusion of the Entyvio  ----- Message -----  From: Lab, Background User  Sent: 4/29/2022   1:07 AM EDT  To: Charlene Addison PA-C

## 2022-05-25 ENCOUNTER — INFUSION (OUTPATIENT)
Dept: ONCOLOGY | Facility: HOSPITAL | Age: 42
End: 2022-05-25

## 2022-05-25 VITALS
DIASTOLIC BLOOD PRESSURE: 82 MMHG | OXYGEN SATURATION: 96 % | SYSTOLIC BLOOD PRESSURE: 123 MMHG | RESPIRATION RATE: 18 BRPM | HEART RATE: 63 BPM | TEMPERATURE: 98.2 F

## 2022-05-25 DIAGNOSIS — K51.919 ULCERATIVE COLITIS WITH COMPLICATION, UNSPECIFIED LOCATION: Primary | ICD-10-CM

## 2022-05-25 PROCEDURE — 96365 THER/PROPH/DIAG IV INF INIT: CPT

## 2022-05-25 PROCEDURE — 25010000002 VEDOLIZUMAB 300 MG RECONSTITUTED SOLUTION 1 EACH VIAL: Performed by: INTERNAL MEDICINE

## 2022-05-25 RX ADMIN — VEDOLIZUMAB 300 MG: 300 INJECTION, POWDER, LYOPHILIZED, FOR SOLUTION INTRAVENOUS at 14:26

## 2022-06-01 ENCOUNTER — OFFICE VISIT (OUTPATIENT)
Dept: GASTROENTEROLOGY | Facility: CLINIC | Age: 42
End: 2022-06-01

## 2022-06-01 VITALS
DIASTOLIC BLOOD PRESSURE: 102 MMHG | HEART RATE: 79 BPM | OXYGEN SATURATION: 99 % | HEIGHT: 69 IN | WEIGHT: 281 LBS | SYSTOLIC BLOOD PRESSURE: 166 MMHG | BODY MASS INDEX: 41.62 KG/M2

## 2022-06-01 DIAGNOSIS — K51.919 ULCERATIVE COLITIS WITH COMPLICATION, UNSPECIFIED LOCATION: Primary | ICD-10-CM

## 2022-06-01 PROCEDURE — 99213 OFFICE O/P EST LOW 20 MIN: CPT | Performed by: PHYSICIAN ASSISTANT

## 2022-06-01 RX ORDER — MONTELUKAST SODIUM 4 MG/1
1 TABLET, CHEWABLE ORAL 2 TIMES DAILY
Qty: 60 TABLET | Refills: 11 | Status: SHIPPED | OUTPATIENT
Start: 2022-06-01 | End: 2022-06-27

## 2022-06-01 NOTE — PROGRESS NOTES
Chief Complaint   Patient presents with   • Ulcerative Colitis       Genevieve Ness is a 41 y.o. female who presents to the office today for evaluation of Ulcerative Colitis  .    HPI   Patient presents the clinic today for follow-up of ulcerative colitis.  She was last seen in clinic 6 weeks ago in which we were trying to get Entyvio approved for her ulcerative colitis as Humira was not being effective. Patient had her Entyvio weeks 0 infusion on 5/11, week 2 infusion on 5/25 and is currently scheduled on 7/24 6-week infusion-however timing does not add up, I will contact the pharmacy regarding scheduling.  Patient states that she has noticed some improvement with the urgency however continues to have a lot of abdominal pain and gas buildup.  She states that the consistency of her stools are very loose and watery-type VII on the [place] stool scale.  Patient states that she never has any formed bowel movements.  She has noted less blood while wiping and mixed with stools however this still does happen occasionally.  Patient states that however her symptoms are not completely controlled she is hesitant about using any additional steroids unless absolutely necessary.    Review of Systems   Constitutional: Negative for fever.   HENT: Negative for trouble swallowing.    Eyes: Negative.    Respiratory: Negative for chest tightness.    Cardiovascular: Negative for chest pain.   Gastrointestinal: Positive for abdominal distention, abdominal pain, anal bleeding, blood in stool, diarrhea and nausea. Negative for constipation and vomiting.   Endocrine: Negative.    Genitourinary: Negative.    Musculoskeletal: Negative.    Skin: Negative.    Allergic/Immunologic: Negative.    Neurological: Positive for headaches.   Hematological: Does not bruise/bleed easily.   Psychiatric/Behavioral: Negative.        ACTIVE PROBLEMS:   Specialty Problems        Gastroenterology Problems    Iron malabsorption        Ulcerative colitis (HCC)     "          PAST MEDICAL HISTORY:  Past Medical History:   Diagnosis Date   • Thyroid disease        SURGICAL HISTORY:  Past Surgical History:   Procedure Laterality Date   •  SECTION     • COLONOSCOPY N/A 2022    Procedure: COLONOSCOPY FOR SCREENING;  Surgeon: Vera Lawton MD;  Location: [facility];  Service: Gastroenterology;  Laterality: N/A;   • ENDOSCOPY N/A 2022    Procedure: ESOPHAGOGASTRODUODENOSCOPY WITH BIOPSY;  Surgeon: Vera Lawton MD;  Location: [facility];  Service: Gastroenterology;  Laterality: N/A;   • UPPER GASTROINTESTINAL ENDOSCOPY     • WISDOM TOOTH EXTRACTION         FAMILY HISTORY:  Family History   Problem Relation Age of Onset   • Breast cancer Neg Hx        SOCIAL HISTORY:  Social History     Tobacco Use   • Smoking status: Former Smoker   • Smokeless tobacco: Never Used   Substance Use Topics   • Alcohol use: Never       CURRENT MEDICATION:    Current Outpatient Medications:   •  mesalamine (Lialda) 1.2 g EC tablet, Take four tablets by mouth all at one time in the morning, Disp: 120 tablet, Rfl: 11  •  mesalamine (ROWASA) 4 g enema, Insert 1 enema into the rectum See Admin Instructions. One enema in rectum nightly, try and retain overnight, Disp: 30 enema, Rfl: 2  •  predniSONE (DELTASONE) 10 MG tablet, Take 1 tablet by mouth Take As Directed. Take 40mg x 7 days, 30mg x 7 days, 20mg x 7 days, 10mg x 7 days, and 5mg x 6 days, Disp: 73 tablet, Rfl: 0  •  colestipol (COLESTID) 1 g tablet, Take 1 tablet by mouth 2 (Two) Times a Day., Disp: 60 tablet, Rfl: 11    ALLERGIES:  Patient has no known allergies.    VISIT VITALS:  BP (!) 166/102   Pulse 79   Ht 175.3 cm (69\")   Wt 127 kg (281 lb)   SpO2 99%   BMI 41.50 kg/m²   Physical Exam  Constitutional:       General: She is not in acute distress.     Appearance: Normal appearance. She is well-developed.   HENT:      Head: Normocephalic and atraumatic.   Eyes:      Pupils: Pupils are equal, " round, and reactive to light.   Cardiovascular:      Rate and Rhythm: Normal rate and regular rhythm.      Heart sounds: Normal heart sounds.   Pulmonary:      Effort: Pulmonary effort is normal. No respiratory distress.      Breath sounds: Normal breath sounds. No wheezing, rhonchi or rales.   Abdominal:      General: Abdomen is flat. Bowel sounds are normal. There is distension.      Palpations: Abdomen is soft. There is no mass.      Tenderness: There is abdominal tenderness. There is no guarding or rebound.      Hernia: No hernia is present.   Musculoskeletal:         General: No swelling. Normal range of motion.      Cervical back: Normal range of motion and neck supple.      Right lower leg: No edema.      Left lower leg: No edema.   Skin:     General: Skin is warm and dry.   Neurological:      Mental Status: She is alert and oriented to person, place, and time.   Psychiatric:         Attention and Perception: Attention normal.         Mood and Affect: Mood normal.         Speech: Speech normal.         Behavior: Behavior normal. Behavior is cooperative.         Thought Content: Thought content normal.         Assessment    Due to the patient's symptoms-she continues to have several episodes of diarrhea daily, I will prescribe her Colestid 1 g twice daily dosing for the meantime to get symptoms under control.  She will continue with her infusions of Entyvio-I am going to contact the pharmacy regarding her 6-week appointment as the timing does not seem to be correct.  At this time she does not want any additional steroids due to the effects.   Diagnosis Plan   1. Ulcerative colitis with complication, unspecified location (HCC)  colestipol (COLESTID) 1 g tablet       Return in about 13 weeks (around 8/31/2022).                   This document has been electronically signed by Charlene Addison PA-C  June 7, 2022 08:29 EDT    Part of this note may be an electronic transcription/translation of spoken language to  printed text using the Dragon Dictation System.

## 2022-06-23 ENCOUNTER — INFUSION (OUTPATIENT)
Dept: ONCOLOGY | Facility: HOSPITAL | Age: 42
End: 2022-06-23

## 2022-06-23 VITALS
OXYGEN SATURATION: 100 % | TEMPERATURE: 97.3 F | SYSTOLIC BLOOD PRESSURE: 149 MMHG | RESPIRATION RATE: 18 BRPM | HEART RATE: 88 BPM | DIASTOLIC BLOOD PRESSURE: 95 MMHG

## 2022-06-23 DIAGNOSIS — K51.919 ULCERATIVE COLITIS WITH COMPLICATION, UNSPECIFIED LOCATION: Primary | ICD-10-CM

## 2022-06-23 PROCEDURE — 25010000002 VEDOLIZUMAB 300 MG RECONSTITUTED SOLUTION 1 EACH VIAL: Performed by: INTERNAL MEDICINE

## 2022-06-23 PROCEDURE — 96365 THER/PROPH/DIAG IV INF INIT: CPT

## 2022-06-23 RX ADMIN — VEDOLIZUMAB 300 MG: 300 INJECTION, POWDER, LYOPHILIZED, FOR SOLUTION INTRAVENOUS at 14:38

## 2022-06-27 ENCOUNTER — OFFICE VISIT (OUTPATIENT)
Dept: GASTROENTEROLOGY | Facility: CLINIC | Age: 42
End: 2022-06-27

## 2022-06-27 VITALS
WEIGHT: 270 LBS | SYSTOLIC BLOOD PRESSURE: 171 MMHG | DIASTOLIC BLOOD PRESSURE: 102 MMHG | HEIGHT: 69 IN | OXYGEN SATURATION: 95 % | HEART RATE: 99 BPM | BODY MASS INDEX: 39.99 KG/M2

## 2022-06-27 DIAGNOSIS — K51.919 ULCERATIVE COLITIS WITH COMPLICATION, UNSPECIFIED LOCATION: ICD-10-CM

## 2022-06-27 PROCEDURE — 99213 OFFICE O/P EST LOW 20 MIN: CPT | Performed by: PHYSICIAN ASSISTANT

## 2022-06-27 RX ORDER — PREDNISONE 10 MG/1
10 TABLET ORAL TAKE AS DIRECTED
Qty: 101 TABLET | Refills: 0 | Status: SHIPPED | OUTPATIENT
Start: 2022-06-27 | End: 2022-07-12

## 2022-07-05 ENCOUNTER — OFFICE VISIT (OUTPATIENT)
Dept: GASTROENTEROLOGY | Facility: CLINIC | Age: 42
End: 2022-07-05

## 2022-07-05 ENCOUNTER — LAB (OUTPATIENT)
Dept: LAB | Facility: HOSPITAL | Age: 42
End: 2022-07-05

## 2022-07-05 VITALS
HEART RATE: 75 BPM | BODY MASS INDEX: 41.62 KG/M2 | DIASTOLIC BLOOD PRESSURE: 114 MMHG | SYSTOLIC BLOOD PRESSURE: 178 MMHG | HEIGHT: 69 IN | OXYGEN SATURATION: 98 % | WEIGHT: 281 LBS

## 2022-07-05 DIAGNOSIS — K51.919 ULCERATIVE COLITIS WITH COMPLICATION, UNSPECIFIED LOCATION: Primary | ICD-10-CM

## 2022-07-05 DIAGNOSIS — K51.919 ULCERATIVE COLITIS WITH COMPLICATION, UNSPECIFIED LOCATION: ICD-10-CM

## 2022-07-05 LAB
027 TOXIN: ABNORMAL
ALBUMIN SERPL-MCNC: 4.2 G/DL (ref 3.5–5.2)
ALBUMIN/GLOB SERPL: 1.4 G/DL
ALP SERPL-CCNC: 50 U/L (ref 39–117)
ALT SERPL W P-5'-P-CCNC: 13 U/L (ref 1–33)
ANION GAP SERPL CALCULATED.3IONS-SCNC: 10.3 MMOL/L (ref 5–15)
AST SERPL-CCNC: 7 U/L (ref 1–32)
BASOPHILS # BLD AUTO: 0.02 10*3/MM3 (ref 0–0.2)
BASOPHILS NFR BLD AUTO: 0.2 % (ref 0–1.5)
BILIRUB SERPL-MCNC: 0.2 MG/DL (ref 0–1.2)
BUN SERPL-MCNC: 8 MG/DL (ref 6–20)
BUN/CREAT SERPL: 11.9 (ref 7–25)
C DIFF TOX GENS STL QL NAA+PROBE: POSITIVE
CALCIUM SPEC-SCNC: 10.1 MG/DL (ref 8.6–10.5)
CHLORIDE SERPL-SCNC: 103 MMOL/L (ref 98–107)
CO2 SERPL-SCNC: 23.7 MMOL/L (ref 22–29)
CREAT SERPL-MCNC: 0.67 MG/DL (ref 0.57–1)
CRP SERPL-MCNC: <0.3 MG/DL (ref 0–0.5)
DEPRECATED RDW RBC AUTO: 39.8 FL (ref 37–54)
EGFRCR SERPLBLD CKD-EPI 2021: 112.8 ML/MIN/1.73
EOSINOPHIL # BLD AUTO: 0.04 10*3/MM3 (ref 0–0.4)
EOSINOPHIL NFR BLD AUTO: 0.4 % (ref 0.3–6.2)
ERYTHROCYTE [DISTWIDTH] IN BLOOD BY AUTOMATED COUNT: 15 % (ref 12.3–15.4)
ERYTHROCYTE [SEDIMENTATION RATE] IN BLOOD: 45 MM/HR (ref 0–20)
GLOBULIN UR ELPH-MCNC: 3 GM/DL
GLUCOSE SERPL-MCNC: 111 MG/DL (ref 65–99)
HCT VFR BLD AUTO: 37.1 % (ref 34–46.6)
HGB BLD-MCNC: 11.6 G/DL (ref 12–15.9)
LYMPHOCYTES # BLD AUTO: 1.5 10*3/MM3 (ref 0.7–3.1)
LYMPHOCYTES NFR BLD AUTO: 16.4 % (ref 19.6–45.3)
MCH RBC QN AUTO: 23.8 PG (ref 26.6–33)
MCHC RBC AUTO-ENTMCNC: 31.3 G/DL (ref 31.5–35.7)
MCV RBC AUTO: 76.2 FL (ref 79–97)
MONOCYTES # BLD AUTO: 0.39 10*3/MM3 (ref 0.1–0.9)
MONOCYTES NFR BLD AUTO: 4.3 % (ref 5–12)
NEUTROPHILS NFR BLD AUTO: 7.15 10*3/MM3 (ref 1.7–7)
NEUTROPHILS NFR BLD AUTO: 78.2 % (ref 42.7–76)
PLATELET # BLD AUTO: 300 10*3/MM3 (ref 140–450)
PMV BLD AUTO: 13.4 FL (ref 6–12)
POTASSIUM SERPL-SCNC: 4.5 MMOL/L (ref 3.5–5.2)
PROT SERPL-MCNC: 7.2 G/DL (ref 6–8.5)
RBC # BLD AUTO: 4.87 10*6/MM3 (ref 3.77–5.28)
SODIUM SERPL-SCNC: 137 MMOL/L (ref 136–145)
WBC NRBC COR # BLD: 9.15 10*3/MM3 (ref 3.4–10.8)

## 2022-07-05 PROCEDURE — 80053 COMPREHEN METABOLIC PANEL: CPT | Performed by: PHYSICIAN ASSISTANT

## 2022-07-05 PROCEDURE — 83993 ASSAY FOR CALPROTECTIN FECAL: CPT

## 2022-07-05 PROCEDURE — 87493 C DIFF AMPLIFIED PROBE: CPT

## 2022-07-05 PROCEDURE — 85652 RBC SED RATE AUTOMATED: CPT | Performed by: PHYSICIAN ASSISTANT

## 2022-07-05 PROCEDURE — 99213 OFFICE O/P EST LOW 20 MIN: CPT | Performed by: PHYSICIAN ASSISTANT

## 2022-07-05 PROCEDURE — 86140 C-REACTIVE PROTEIN: CPT | Performed by: PHYSICIAN ASSISTANT

## 2022-07-05 PROCEDURE — 85025 COMPLETE CBC W/AUTO DIFF WBC: CPT | Performed by: PHYSICIAN ASSISTANT

## 2022-07-05 NOTE — PROGRESS NOTES
Chief Complaint   Patient presents with   • Ulcerative Colitis       Genevieve Ness is a 41 y.o. female who presents to the office today for evaluation of Ulcerative Colitis  .    HPI  Patient presents the clinic today for follow-up of ulcerative colitis.  She was seen in clinic roughly 1 week ago in which we started her on a prednisone taper pack due to her significant abdominal pain and diarrhea.  Patient states that the taper pack has been helping to decrease down some of the abdominal pain she is experiencing however diarrhea does not seem to be improving.  She is still having between 5-10 bowel movements daily that range from type V-VII on the Greenleaf stool scale.  She is still continuing to take Imodium to help slow down some of the diarrhea so she is able to work.  She has not noticed as much blood occurring in stools since starting the prednisone taper.  She is currently still on her 40 mg dose at the moment.  The abdominal pain gets so severe at times she becomes extremely nauseous and could vomit at times.    Review of Systems   Constitutional: Negative for fever.   HENT: Negative for trouble swallowing.    Eyes: Negative.    Respiratory: Negative for chest tightness.    Cardiovascular: Negative for chest pain.   Gastrointestinal: Positive for abdominal distention, abdominal pain, anal bleeding, blood in stool, diarrhea, nausea, rectal pain and vomiting. Negative for constipation.   Endocrine: Negative.    Genitourinary: Negative.    Musculoskeletal: Negative.    Skin: Negative.    Allergic/Immunologic: Negative.    Neurological: Positive for headaches.   Hematological: Does not bruise/bleed easily.   Psychiatric/Behavioral: Negative.        ACTIVE PROBLEMS:   Specialty Problems        Gastroenterology Problems    Iron malabsorption        Ulcerative colitis (HCC)              PAST MEDICAL HISTORY:  Past Medical History:   Diagnosis Date   • Thyroid disease        SURGICAL HISTORY:  Past Surgical History:  "  Procedure Laterality Date   •  SECTION     • COLONOSCOPY N/A 2022    Procedure: COLONOSCOPY FOR SCREENING;  Surgeon: Vera Lawton MD;  Location: [facility] OR;  Service: Gastroenterology;  Laterality: N/A;   • ENDOSCOPY N/A 2022    Procedure: ESOPHAGOGASTRODUODENOSCOPY WITH BIOPSY;  Surgeon: Vera Lawton MD;  Location: [facility] OR;  Service: Gastroenterology;  Laterality: N/A;   • UPPER GASTROINTESTINAL ENDOSCOPY     • WISDOM TOOTH EXTRACTION         FAMILY HISTORY:  Family History   Problem Relation Age of Onset   • Breast cancer Neg Hx        SOCIAL HISTORY:  Social History     Tobacco Use   • Smoking status: Former Smoker   • Smokeless tobacco: Never Used   Substance Use Topics   • Alcohol use: Never       CURRENT MEDICATION:    Current Outpatient Medications:   •  mesalamine (Lialda) 1.2 g EC tablet, Take four tablets by mouth all at one time in the morning, Disp: 120 tablet, Rfl: 11  •  mesalamine (ROWASA) 4 g enema, Insert 1 enema into the rectum See Admin Instructions. One enema in rectum nightly, try and retain overnight, Disp: 30 enema, Rfl: 2  •  predniSONE (DELTASONE) 10 MG tablet, Take 1 tablet by mouth Take As Directed. Take 40mg x 14 days, 30mg x 7 days, 20mg x 7 days, 10mg x 7 days, and 5mg x 7 days, Disp: 101 tablet, Rfl: 0  •  vancomycin (VANCOCIN) 250 MG capsule, Take 1 capsule by mouth 4 (Four) Times a Day for 14 days. Indications: Clostridium Difficile Infection, Disp: 56 capsule, Rfl: 0    ALLERGIES:  Patient has no known allergies.    VISIT VITALS:  BP (!) 178/114   Pulse 75   Ht 175.3 cm (69\")   Wt 127 kg (281 lb)   SpO2 98%   BMI 41.50 kg/m²   Physical Exam  Constitutional:       General: She is not in acute distress.     Appearance: Normal appearance. She is well-developed.   HENT:      Head: Normocephalic and atraumatic.   Eyes:      Pupils: Pupils are equal, round, and reactive to light.   Cardiovascular:      Rate and Rhythm: Normal " rate and regular rhythm.      Heart sounds: Normal heart sounds.   Pulmonary:      Effort: Pulmonary effort is normal. No respiratory distress.      Breath sounds: Normal breath sounds. No wheezing, rhonchi or rales.   Abdominal:      General: Abdomen is flat. Bowel sounds are normal. There is no distension.      Palpations: Abdomen is soft. There is no mass.      Tenderness: There is abdominal tenderness (Generalized). There is no guarding or rebound.      Hernia: No hernia is present.   Musculoskeletal:         General: No swelling. Normal range of motion.      Cervical back: Normal range of motion and neck supple.      Right lower leg: No edema.      Left lower leg: No edema.   Skin:     General: Skin is warm and dry.   Neurological:      Mental Status: She is alert and oriented to person, place, and time.   Psychiatric:         Attention and Perception: Attention normal.         Mood and Affect: Mood normal.         Speech: Speech normal.         Behavior: Behavior normal. Behavior is cooperative.         Thought Content: Thought content normal.         Assessment     Due to the patient's symptoms not significantly improving on prednisone taper-I will go ahead and obtain a CBC, CMP, CRP, sed rate and stool studies.  Depending on results we will treat accordingly.  We may have to look at changing her biologic Entyvio to other treatment if her fecal Sukumar is still significantly elevated.   Diagnosis Plan   1. Ulcerative colitis with complication, unspecified location (HCC)  Calprotectin, Fecal - Stool, Per Rectum    CBC & Differential    C-reactive Protein    Sedimentation Rate    Comprehensive Metabolic Panel    Clostridium Difficile Toxin - Stool, Per Rectum       Return in about 4 weeks (around 8/2/2022).                   This document has been electronically signed by Charlene Johnson PA-C  July 7, 2022 11:25 EDT    Part of this note may be an electronic transcription/translation of spoken language to printed  text using the Dragon Dictation System.

## 2022-07-06 DIAGNOSIS — K51.919 ULCERATIVE COLITIS WITH COMPLICATION, UNSPECIFIED LOCATION: Primary | ICD-10-CM

## 2022-07-06 RX ORDER — VANCOMYCIN HYDROCHLORIDE 250 MG/1
250 CAPSULE ORAL 4 TIMES DAILY
Qty: 56 CAPSULE | Refills: 0 | Status: SHIPPED | OUTPATIENT
Start: 2022-07-06 | End: 2022-07-20

## 2022-07-10 LAB — CALPROTECTIN STL-MCNT: 3369 UG/G (ref 0–120)

## 2022-07-12 DIAGNOSIS — A04.72 COLITIS, CLOSTRIDIUM DIFFICILE: Primary | ICD-10-CM

## 2022-07-12 RX ORDER — VANCOMYCIN HYDROCHLORIDE 250 MG/1
250 CAPSULE ORAL TAKE AS DIRECTED
Qty: 84 CAPSULE | Refills: 0 | Status: SHIPPED | OUTPATIENT
Start: 2022-07-12 | End: 2022-07-21

## 2022-07-21 DIAGNOSIS — A04.72 COLITIS, CLOSTRIDIUM DIFFICILE: ICD-10-CM

## 2022-07-21 DIAGNOSIS — K51.919 ULCERATIVE COLITIS WITH COMPLICATION, UNSPECIFIED LOCATION: Primary | ICD-10-CM

## 2022-07-21 RX ORDER — VANCOMYCIN HYDROCHLORIDE 250 MG/1
250 CAPSULE ORAL 3 TIMES DAILY
Qty: 43 CAPSULE | Refills: 0 | Status: SHIPPED | OUTPATIENT
Start: 2022-07-21 | End: 2022-08-04

## 2022-07-21 RX ORDER — PREDNISONE 20 MG/1
40 TABLET ORAL DAILY
Qty: 28 TABLET | Refills: 0 | Status: SHIPPED | OUTPATIENT
Start: 2022-07-21 | End: 2022-08-04

## 2022-07-21 RX ORDER — METRONIDAZOLE 500 MG/1
500 TABLET ORAL 3 TIMES DAILY
Qty: 30 TABLET | Refills: 0 | Status: SHIPPED | OUTPATIENT
Start: 2022-07-21 | End: 2022-07-31

## 2022-07-22 ENCOUNTER — TRANSCRIBE ORDERS (OUTPATIENT)
Dept: ONCOLOGY | Facility: HOSPITAL | Age: 42
End: 2022-07-22

## 2022-07-26 ENCOUNTER — TRANSCRIBE ORDERS (OUTPATIENT)
Dept: ONCOLOGY | Facility: HOSPITAL | Age: 42
End: 2022-07-26

## 2022-07-28 ENCOUNTER — TELEPHONE (OUTPATIENT)
Dept: UROLOGY | Facility: CLINIC | Age: 42
End: 2022-07-28

## 2022-07-28 NOTE — TELEPHONE ENCOUNTER
The patient was told to call and let us know when she hears from the infusion clinic but she stated she still hasn't.

## 2022-08-08 ENCOUNTER — INFUSION (OUTPATIENT)
Dept: ONCOLOGY | Facility: HOSPITAL | Age: 42
End: 2022-08-08

## 2022-08-08 VITALS
RESPIRATION RATE: 18 BRPM | OXYGEN SATURATION: 95 % | DIASTOLIC BLOOD PRESSURE: 92 MMHG | SYSTOLIC BLOOD PRESSURE: 145 MMHG | TEMPERATURE: 98.2 F | HEART RATE: 95 BPM

## 2022-08-08 DIAGNOSIS — K51.919 ULCERATIVE COLITIS WITH COMPLICATION, UNSPECIFIED LOCATION: Primary | ICD-10-CM

## 2022-08-08 PROCEDURE — 86580 TB INTRADERMAL TEST: CPT

## 2022-08-08 PROCEDURE — 86480 TB TEST CELL IMMUN MEASURE: CPT | Performed by: PHYSICIAN ASSISTANT

## 2022-08-08 RX ORDER — ACETAMINOPHEN 325 MG/1
650 TABLET ORAL ONCE
Status: DISCONTINUED | OUTPATIENT
Start: 2022-08-08 | End: 2022-08-08 | Stop reason: HOSPADM

## 2022-08-08 RX ORDER — DIPHENHYDRAMINE HCL 25 MG
25 CAPSULE ORAL ONCE
Status: CANCELLED | OUTPATIENT
Start: 2022-10-03

## 2022-08-08 RX ORDER — ACETAMINOPHEN 325 MG/1
650 TABLET ORAL ONCE
Status: CANCELLED | OUTPATIENT
Start: 2022-10-03

## 2022-08-08 RX ORDER — DIPHENHYDRAMINE HCL 25 MG
25 CAPSULE ORAL ONCE
Status: DISCONTINUED | OUTPATIENT
Start: 2022-08-08 | End: 2022-08-08 | Stop reason: HOSPADM

## 2022-08-08 NOTE — PROGRESS NOTES
08- PATIENT HERE FOR STELARA INFUSION, BUT TB SCREENING HAD NOT BEEN DONE. ORDER RECD FROM PROVIDER TO DO QUANTIFERON GOLD. PT RESCHEDULED FOR STELARA UNTIL RESULTS RECD. MVRN.

## 2022-08-10 LAB
GAMMA INTERFERON BACKGROUND BLD IA-ACNC: 0 IU/ML
M TB IFN-G BLD-IMP: NEGATIVE
M TB IFN-G CD4+ BCKGRND COR BLD-ACNC: 0 IU/ML
M TB IFN-G CD4+CD8+ BCKGRND COR BLD-ACNC: 0 IU/ML
MITOGEN IGNF BLD-ACNC: 4.14 IU/ML
QUANTIFERON INCUBATION: NORMAL
SERVICE CMNT-IMP: NORMAL

## 2022-08-12 ENCOUNTER — INFUSION (OUTPATIENT)
Dept: ONCOLOGY | Facility: HOSPITAL | Age: 42
End: 2022-08-12

## 2022-08-12 VITALS
HEART RATE: 80 BPM | RESPIRATION RATE: 18 BRPM | OXYGEN SATURATION: 100 % | SYSTOLIC BLOOD PRESSURE: 137 MMHG | DIASTOLIC BLOOD PRESSURE: 87 MMHG | TEMPERATURE: 97.3 F

## 2022-08-12 DIAGNOSIS — K51.919 ULCERATIVE COLITIS WITH COMPLICATION, UNSPECIFIED LOCATION: Primary | ICD-10-CM

## 2022-08-12 PROCEDURE — 25010000002 USTEKINUMAB 130 MG/26ML SOLUTION 26 ML VIAL: Performed by: PHYSICIAN ASSISTANT

## 2022-08-12 PROCEDURE — 96365 THER/PROPH/DIAG IV INF INIT: CPT

## 2022-08-12 PROCEDURE — 63710000001 DIPHENHYDRAMINE PER 50 MG: Performed by: PHYSICIAN ASSISTANT

## 2022-08-12 RX ORDER — ACETAMINOPHEN 325 MG/1
650 TABLET ORAL ONCE
OUTPATIENT
Start: 2022-10-03

## 2022-08-12 RX ORDER — DIPHENHYDRAMINE HCL 25 MG
25 CAPSULE ORAL ONCE
OUTPATIENT
Start: 2022-10-03

## 2022-08-12 RX ORDER — ACETAMINOPHEN 325 MG/1
650 TABLET ORAL ONCE
Status: DISCONTINUED | OUTPATIENT
Start: 2022-08-12 | End: 2022-08-12 | Stop reason: HOSPADM

## 2022-08-12 RX ORDER — DIPHENHYDRAMINE HCL 25 MG
25 CAPSULE ORAL ONCE
Status: COMPLETED | OUTPATIENT
Start: 2022-08-12 | End: 2022-08-12

## 2022-08-12 RX ADMIN — DIPHENHYDRAMINE HYDROCHLORIDE 25 MG: 25 CAPSULE ORAL at 15:03

## 2022-08-12 RX ADMIN — USTEKINUMAB 520 MG: 130 SOLUTION INTRAVENOUS at 15:04

## 2022-08-17 RX ORDER — USTEKINUMAB 90 MG/ML
90 INJECTION, SOLUTION SUBCUTANEOUS
Qty: 1 ML | Refills: 0 | Status: SHIPPED | OUTPATIENT
Start: 2022-08-17 | End: 2022-08-19 | Stop reason: SDUPTHER

## 2022-08-17 NOTE — PROGRESS NOTES
[facility] received referral for Stelara injection from provider, Charlene Johnson. The patient is required to fill medication through [facility]. Per Epic chart, patient received Stelara infusion on 8/12/22. I have sent in one fill for Stelara 90 mg subq to [facility] specialty pharmacy with provider co-sign per initial referral received. Referring provider will be responsible for management of patient and medication as the clinic will not be following/managing patient.       Thank you,    Amy Pearson. Marcie, PharmD  08/17/22  12:53 EDT

## 2022-08-18 ENCOUNTER — TELEPHONE (OUTPATIENT)
Dept: PHARMACY | Facility: HOSPITAL | Age: 42
End: 2022-08-18

## 2022-08-19 DIAGNOSIS — K51.919 ULCERATIVE COLITIS WITH COMPLICATION, UNSPECIFIED LOCATION: Primary | ICD-10-CM

## 2022-08-19 RX ORDER — USTEKINUMAB 90 MG/ML
90 INJECTION, SOLUTION SUBCUTANEOUS
Qty: 1 ML | Refills: 0 | Status: SHIPPED | OUTPATIENT
Start: 2022-08-19 | End: 2022-10-04 | Stop reason: SDUPTHER

## 2022-08-22 ENCOUNTER — OFFICE VISIT (OUTPATIENT)
Dept: GASTROENTEROLOGY | Facility: CLINIC | Age: 42
End: 2022-08-22

## 2022-08-22 VITALS — BODY MASS INDEX: 41.62 KG/M2 | HEIGHT: 69 IN | WEIGHT: 281 LBS

## 2022-08-22 DIAGNOSIS — K51.919 ULCERATIVE COLITIS WITH COMPLICATION, UNSPECIFIED LOCATION: ICD-10-CM

## 2022-08-22 DIAGNOSIS — A04.72 COLITIS, CLOSTRIDIUM DIFFICILE: Primary | ICD-10-CM

## 2022-08-22 PROCEDURE — 99213 OFFICE O/P EST LOW 20 MIN: CPT | Performed by: PHYSICIAN ASSISTANT

## 2022-08-22 NOTE — PROGRESS NOTES
Chief Complaint   Patient presents with   • Ulcerative Colitis       Genevieve Ness is a 42 y.o. female who presents to the office today for evaluation of Ulcerative Colitis  .    HPI  Patient presents to the clinic today for follow-up of ulcerative colitis.  She was last seen back in clinic in early July in which she was diagnosed with C. difficile.  She is currently been undergoing antibiotic therapy using Flagyl and vancomycin which significantly helped with her abdominal pain, urgency and diarrhea.  She is no longer experiencing nearly as much abdominal pain nor diarrhea as before.  She still having occasional bowel movements with bright red blood.  She states by the end of her antibiotic therapy she had 3 almost completely normal days without pain or diarrhea.  Not long after that symptoms started progressively returning back to where they were prior to antibiotics.  She completed her last day of antibiotics around August 16/17.  She is almost completely finished with her prednisone taper pack as well.  Her dosage is down to 10 mg currently.  She went for her infusion of Stelara on 8/17 and has done well since.  She has had no reaction to the medication.    Review of Systems   Constitutional: Negative for fever.   HENT: Negative for trouble swallowing.    Eyes: Negative.    Respiratory: Negative for chest tightness.    Cardiovascular: Negative for chest pain.   Gastrointestinal: Positive for abdominal distention, abdominal pain, anal bleeding, blood in stool, diarrhea, nausea and rectal pain. Negative for constipation and vomiting.   Endocrine: Negative.    Genitourinary: Negative.    Musculoskeletal: Negative.    Skin: Negative.    Allergic/Immunologic: Negative.    Neurological: Positive for headaches.   Hematological: Does not bruise/bleed easily.   Psychiatric/Behavioral: Negative.        ACTIVE PROBLEMS:   Specialty Problems        Gastroenterology Problems    Iron malabsorption        Ulcerative colitis  "(HCC)              PAST MEDICAL HISTORY:  Past Medical History:   Diagnosis Date   • Thyroid disease        SURGICAL HISTORY:  Past Surgical History:   Procedure Laterality Date   •  SECTION     • COLONOSCOPY N/A 2022    Procedure: COLONOSCOPY FOR SCREENING;  Surgeon: Vera Lawton MD;  Location: [facility];  Service: Gastroenterology;  Laterality: N/A;   • ENDOSCOPY N/A 2022    Procedure: ESOPHAGOGASTRODUODENOSCOPY WITH BIOPSY;  Surgeon: Vera Lawton MD;  Location: [facility];  Service: Gastroenterology;  Laterality: N/A;   • UPPER GASTROINTESTINAL ENDOSCOPY     • WISDOM TOOTH EXTRACTION         FAMILY HISTORY:  Family History   Problem Relation Age of Onset   • Breast cancer Neg Hx        SOCIAL HISTORY:  Social History     Tobacco Use   • Smoking status: Former Smoker   • Smokeless tobacco: Never Used   Substance Use Topics   • Alcohol use: Never       CURRENT MEDICATION:    Current Outpatient Medications:   •  mesalamine (Lialda) 1.2 g EC tablet, Take four tablets by mouth all at one time in the morning, Disp: 120 tablet, Rfl: 11  •  mesalamine (ROWASA) 4 g enema, Insert 1 enema into the rectum See Admin Instructions. One enema in rectum nightly, try and retain overnight, Disp: 30 enema, Rfl: 2  •  Ustekinumab (Stelara) 90 MG/ML solution prefilled syringe Injection, Inject 90 mg under the skin into the appropriate area as directed Every 2 (Two) Months., Disp: 1 mL, Rfl: 0  •  Budesonide (ENTOCORT EC) 3 MG 24 hr capsule, Take 3 capsules by mouth Take As Directed. Take 9mg x2 weeks then 9mg every other day x 2weeks, Disp: 63 capsule, Rfl: 0  •  vancomycin (VANCOCIN) 250 MG capsule, Take 1 capsule by mouth Take As Directed. 4 daily x14 days, 3 x14 days, [id number], 1x14 days, 1 every other day  Indications: Clostridium Difficile Infection, Disp: 147 capsule, Rfl: 0    ALLERGIES:  Patient has no known allergies.    VISIT VITALS:  Ht 175.3 cm (69\")   Wt 127 kg (281 lb)  "  BMI 41.50 kg/m²   Physical Exam  Constitutional:       General: She is not in acute distress.     Appearance: Normal appearance. She is well-developed.   HENT:      Head: Normocephalic and atraumatic.   Eyes:      Pupils: Pupils are equal, round, and reactive to light.   Cardiovascular:      Rate and Rhythm: Normal rate and regular rhythm.      Heart sounds: Normal heart sounds.   Pulmonary:      Effort: Pulmonary effort is normal. No respiratory distress.      Breath sounds: Normal breath sounds. No wheezing, rhonchi or rales.   Abdominal:      General: Abdomen is flat. Bowel sounds are normal. There is no distension.      Palpations: Abdomen is soft. There is no mass.      Tenderness: There is abdominal tenderness. There is no guarding or rebound.      Hernia: No hernia is present.   Musculoskeletal:         General: No swelling. Normal range of motion.      Cervical back: Normal range of motion and neck supple.      Right lower leg: No edema.      Left lower leg: No edema.   Skin:     General: Skin is warm and dry.   Neurological:      Mental Status: She is alert and oriented to person, place, and time.   Psychiatric:         Attention and Perception: Attention normal.         Mood and Affect: Mood normal.         Speech: Speech normal.         Behavior: Behavior normal. Behavior is cooperative.         Thought Content: Thought content normal.         Assessment    Due to the patient's ulcerative colitis-we will switch her over to a budesonide 9 mg taper to help control symptoms somewhat.  She will also be tested once again for C. difficile.  Lab contacted office and was confirmed positive by PCR testing.  She will be placed on a vancomycin taper starting with 4 times daily for 2 weeks, 3 times daily for 2 weeks, 2 times daily for 2 weeks and 1 times daily for 2 weeks.   Diagnosis Plan   1. Colitis, Clostridium difficile  Clostridioides difficile Toxin - Stool, Per Rectum    vancomycin (VANCOCIN) 250 MG capsule    2. Ulcerative colitis with complication, unspecified location (HCC)  Budesonide (ENTOCORT EC) 3 MG 24 hr capsule       Return in about 2 months (around 10/22/2022).                   This document has been electronically signed by Charlene Johnson PA-C  August 23, 2022 16:19 EDT    Part of this note may be an electronic transcription/translation of spoken language to printed text using the Dragon Dictation System.

## 2022-08-23 ENCOUNTER — LAB (OUTPATIENT)
Dept: LAB | Facility: HOSPITAL | Age: 42
End: 2022-08-23

## 2022-08-23 DIAGNOSIS — A04.72 COLITIS, CLOSTRIDIUM DIFFICILE: ICD-10-CM

## 2022-08-23 LAB — C DIFF GDH + TOXINS A+B STL QL IA.RAPID: NEGATIVE

## 2022-08-23 PROCEDURE — 87493 C DIFF AMPLIFIED PROBE: CPT

## 2022-08-23 PROCEDURE — 87449 NOS EACH ORGANISM AG IA: CPT

## 2022-08-23 RX ORDER — VANCOMYCIN HYDROCHLORIDE 250 MG/1
250 CAPSULE ORAL TAKE AS DIRECTED
Qty: 147 CAPSULE | Refills: 0 | Status: SHIPPED | OUTPATIENT
Start: 2022-08-23 | End: 2022-12-21

## 2022-08-23 RX ORDER — BUDESONIDE 3 MG/1
9 CAPSULE, COATED PELLETS ORAL TAKE AS DIRECTED
Qty: 63 CAPSULE | Refills: 0 | Status: SHIPPED | OUTPATIENT
Start: 2022-08-23 | End: 2022-12-21

## 2022-08-28 LAB
027 TOXIN: ABNORMAL
C DIFF TOX GENS STL QL NAA+PROBE: POSITIVE

## 2022-09-27 ENCOUNTER — TELEPHONE (OUTPATIENT)
Dept: GASTROENTEROLOGY | Facility: CLINIC | Age: 42
End: 2022-09-27

## 2022-09-27 NOTE — TELEPHONE ENCOUNTER
----- Message from Charlene Johnson PA-C sent at 9/14/2022  8:57 AM EDT -----  Regarding: FW: Stelara maintenance  Can you please contact jasmin and see if her [facility] pharmacy has contacted her regarding her medication?   ----- Message -----  From: Wandy Lee  Sent: 9/14/2022   8:39 AM EDT  To: Charlene Johnson PA-C  Subject: Stelara maintenance                                Hi Charlene,  Her insurance will not cover the Stelara maintenance doses under her medical benefits, so she will need to get it through pharmacy benefits at [facility], home infusion or non-hospital based infusion clinic.  Please let me know if you have any questions or concerns.    Thanks  Wandy

## 2022-10-03 DIAGNOSIS — K51.919 ULCERATIVE COLITIS WITH COMPLICATION, UNSPECIFIED LOCATION: Primary | ICD-10-CM

## 2022-10-04 RX ORDER — USTEKINUMAB 90 MG/ML
INJECTION, SOLUTION SUBCUTANEOUS
Qty: 1 EACH | Refills: 11 | Status: SHIPPED | OUTPATIENT
Start: 2022-10-04

## 2022-10-10 ENCOUNTER — OFFICE VISIT (OUTPATIENT)
Dept: GASTROENTEROLOGY | Facility: CLINIC | Age: 42
End: 2022-10-10

## 2022-10-10 VITALS — BODY MASS INDEX: 42.21 KG/M2 | HEIGHT: 69 IN | WEIGHT: 285 LBS

## 2022-10-10 DIAGNOSIS — K51.919 ULCERATIVE COLITIS WITH COMPLICATION, UNSPECIFIED LOCATION: ICD-10-CM

## 2022-10-10 DIAGNOSIS — Z86.010 HISTORY OF COLON POLYPS: Primary | ICD-10-CM

## 2022-10-10 PROCEDURE — 99214 OFFICE O/P EST MOD 30 MIN: CPT | Performed by: PHYSICIAN ASSISTANT

## 2022-10-10 RX ORDER — POLYETHYLENE GLYCOL 3350 17 G/17G
510 POWDER, FOR SOLUTION ORAL TAKE AS DIRECTED
Qty: 510 G | Refills: 0 | Status: SHIPPED | OUTPATIENT
Start: 2022-10-10 | End: 2022-12-22 | Stop reason: HOSPADM

## 2022-10-10 RX ORDER — BISACODYL 5 MG/1
20 TABLET, DELAYED RELEASE ORAL ONCE
Qty: 4 TABLET | Refills: 0 | Status: SHIPPED | OUTPATIENT
Start: 2022-10-10 | End: 2022-10-10

## 2022-10-10 NOTE — PROGRESS NOTES
"Chief Complaint   Patient presents with   • Ulcerative Colitis       Genevieve Ness is a 42 y.o. female who presents to the office today for evaluation of Ulcerative Colitis  .    HPI    Mrs. Ness is a 42-year-old female who presents today for a follow-up for ulcerative colitis.    The patient reports she was doing really good at first, but it started getting worse again. She states it is a little better today. She notes she got the shot of Stelara on 10/06/2022. She reports she tried to go to work the next, but she couldn't tell if it made a difference until \"maybe today.\" The patient states she started feeling worse again around 10/01/2022 or 10/02/2022. She notes on 10/03/2022 and 10/04/2022 she started bleeding again. The patient reports the loose stool and diarrhea and frequency is not like it was prior. She states she isn't sure if the c. difficile is coming back. She notes the Stelara works. She reports she is still on antibiotics for the c. difficile . The patient states the bleeding is not so bad that it is dripping. She notes her stool is more of a phlegm with specks of dark blood. Her last Stelara shot was on 10/06/2022. The patient reports having abdomen pain with it, but it resolved. She states she took Imodium yesterday, 10/09/2022. She notes she has only gone twice this morning. She reports there is less bleeding. The patient states she has to go out of town on Wednesday, 10/12/2022. She notes she is getting mouth spres that take a while to heal.    Review of Systems   Constitutional: Negative for fever.   HENT: Negative for trouble swallowing.    Eyes: Negative.    Respiratory: Negative for chest tightness.    Cardiovascular: Negative for chest pain.   Gastrointestinal: Positive for abdominal distention, abdominal pain, anal bleeding, blood in stool, diarrhea, nausea and rectal pain. Negative for constipation and vomiting.   Endocrine: Negative.    Genitourinary: Negative.    Musculoskeletal: " Negative.    Skin: Negative.    Allergic/Immunologic: Negative.    Neurological: Positive for headaches.   Hematological: Does not bruise/bleed easily.   Psychiatric/Behavioral: Negative.        ACTIVE PROBLEMS:   Specialty Problems        Gastroenterology Problems    Iron malabsorption        Ulcerative colitis (HCC)           PAST MEDICAL HISTORY:  Past Medical History:   Diagnosis Date   • Thyroid disease        SURGICAL HISTORY:  Past Surgical History:   Procedure Laterality Date   •  SECTION     • COLONOSCOPY N/A 2022    Procedure: COLONOSCOPY FOR SCREENING;  Surgeon: Vera Lawton MD;  Location: [facility];  Service: Gastroenterology;  Laterality: N/A;   • ENDOSCOPY N/A 2022    Procedure: ESOPHAGOGASTRODUODENOSCOPY WITH BIOPSY;  Surgeon: Vera Lawton MD;  Location: [facility];  Service: Gastroenterology;  Laterality: N/A;   • UPPER GASTROINTESTINAL ENDOSCOPY     • WISDOM TOOTH EXTRACTION         FAMILY HISTORY:  Family History   Problem Relation Age of Onset   • Breast cancer Neg Hx        SOCIAL HISTORY:  Social History     Tobacco Use   • Smoking status: Former   • Smokeless tobacco: Never   Substance Use Topics   • Alcohol use: Never       CURRENT MEDICATION:    Current Outpatient Medications:   •  Budesonide (ENTOCORT EC) 3 MG 24 hr capsule, Take 3 capsules by mouth Take As Directed. Take 9mg x2 weeks then 9mg every other day x 2weeks, Disp: 63 capsule, Rfl: 0  •  mesalamine (Lialda) 1.2 g EC tablet, Take four tablets by mouth all at one time in the morning, Disp: 120 tablet, Rfl: 11  •  mesalamine (ROWASA) 4 g enema, Insert 1 enema into the rectum See Admin Instructions. One enema in rectum nightly, try and retain overnight, Disp: 30 enema, Rfl: 2  •  Stelara 90 MG/ML solution prefilled syringe Injection, MAINTENANCE: INJECT 1 SYRINGE SUBCUTANEOUSLY EVERY 8 WEEKS. REFRIGERATE. DO NOT FREEZE., Disp: 1 each, Rfl: 11  •  vancomycin (VANCOCIN) 250 MG capsule,  "Take 1 capsule by mouth Take As Directed. 4 daily x14 days, 3 x14 days, [id number], 1x14 days, 1 every other day  Indications: Clostridium Difficile Infection, Disp: 147 capsule, Rfl: 0  •  polyethylene glycol (MIRALAX) 17 GM/SCOOP powder, Take 510 g by mouth Take As Directed. Place 15 cap fulls of powder in 32 oz of liquid of choice at 4:00 and 10:00 PM, Disp: 510 g, Rfl: 0    ALLERGIES:  Patient has no known allergies.    VISIT VITALS:  Ht 175.3 cm (69\")   Wt 129 kg (285 lb)   BMI 42.09 kg/m²   Physical Exam  Constitutional:       General: She is not in acute distress.     Appearance: Normal appearance. She is well-developed.   HENT:      Head: Normocephalic and atraumatic.   Eyes:      Pupils: Pupils are equal, round, and reactive to light.   Cardiovascular:      Rate and Rhythm: Normal rate and regular rhythm.      Heart sounds: Normal heart sounds.   Pulmonary:      Effort: Pulmonary effort is normal. No respiratory distress.      Breath sounds: Normal breath sounds. No wheezing, rhonchi or rales.   Abdominal:      General: Abdomen is flat. Bowel sounds are normal. There is no distension.      Palpations: Abdomen is soft. There is no mass.      Tenderness: There is no abdominal tenderness. There is no guarding or rebound.      Hernia: No hernia is present.   Musculoskeletal:         General: No swelling. Normal range of motion.      Cervical back: Normal range of motion and neck supple.      Right lower leg: No edema.      Left lower leg: No edema.   Skin:     General: Skin is warm and dry.   Neurological:      Mental Status: She is alert and oriented to person, place, and time.   Psychiatric:         Attention and Perception: Attention normal.         Mood and Affect: Mood normal.         Speech: Speech normal.         Behavior: Behavior normal. Behavior is cooperative.         Thought Content: Thought content normal.         Assessment    Ulcerative colitis  She will continue on her scheduled routine and " Stelara injections.  I am hoping that her issues started due to medication leaving her system prior to next injection.  She will start back on the mesalamine pill 1.2 g 2 times daily.  We will obtain a colonoscopy at the end of 12/2022 to remove previously found colon polyps.   Diagnosis Plan   1. History of colon polyps  Case Request    Follow Anesthesia Guidelines / Protocol    Obtain Informed Consent    Case Request    bisacodyl (DULCOLAX) 5 MG EC tablet    polyethylene glycol (MIRALAX) 17 GM/SCOOP powder      2. Ulcerative colitis with complication, unspecified location (HCC)  Case Request    Follow Anesthesia Guidelines / Protocol    Obtain Informed Consent    Case Request    bisacodyl (DULCOLAX) 5 MG EC tablet    polyethylene glycol (MIRALAX) 17 GM/SCOOP powder          Return in about 4 weeks (around 11/7/2022).               This document has been electronically signed by Charlene Johnson PA-C  October 13, 2022 13:20 EDT    Part of this note may be an electronic transcription/translation of spoken language to printed text using the Dragon Dictation System.    Transcribed from ambient dictation for Charlene Johnson PA-C by Jeni Allen.  10/10/22   15:45 EDT    Patient or patient representative verbalized consent to the visit recording.  I have personally performed the services described in this document as transcribed by the above individual, and it is both accurate and complete.

## 2022-12-19 PROBLEM — Z86.0100 HISTORY OF COLON POLYPS: Status: ACTIVE | Noted: 2022-12-19

## 2022-12-19 PROBLEM — Z86.010 HISTORY OF COLON POLYPS: Status: ACTIVE | Noted: 2022-12-19

## 2022-12-22 ENCOUNTER — ANESTHESIA (OUTPATIENT)
Dept: PERIOP | Facility: HOSPITAL | Age: 42
End: 2022-12-22

## 2022-12-22 ENCOUNTER — ANESTHESIA EVENT (OUTPATIENT)
Dept: PERIOP | Facility: HOSPITAL | Age: 42
End: 2022-12-22

## 2022-12-22 ENCOUNTER — HOSPITAL ENCOUNTER (OUTPATIENT)
Facility: HOSPITAL | Age: 42
Setting detail: HOSPITAL OUTPATIENT SURGERY
Discharge: HOME | End: 2022-12-22
Attending: INTERNAL MEDICINE | Admitting: INTERNAL MEDICINE

## 2022-12-22 VITALS
RESPIRATION RATE: 20 BRPM | DIASTOLIC BLOOD PRESSURE: 84 MMHG | SYSTOLIC BLOOD PRESSURE: 127 MMHG | WEIGHT: 285 LBS | HEART RATE: 69 BPM | OXYGEN SATURATION: 100 % | HEIGHT: 69 IN | BODY MASS INDEX: 42.21 KG/M2 | TEMPERATURE: 97.2 F

## 2022-12-22 DIAGNOSIS — K51.919 ULCERATIVE COLITIS WITH COMPLICATION, UNSPECIFIED LOCATION: ICD-10-CM

## 2022-12-22 DIAGNOSIS — K51.019 ULCERATIVE PANCOLITIS WITH COMPLICATION: Primary | ICD-10-CM

## 2022-12-22 DIAGNOSIS — Z86.010 HISTORY OF COLON POLYPS: ICD-10-CM

## 2022-12-22 PROCEDURE — 88305 TISSUE EXAM BY PATHOLOGIST: CPT

## 2022-12-22 PROCEDURE — 25010000002 PROPOFOL 10 MG/ML EMULSION: Performed by: NURSE ANESTHETIST, CERTIFIED REGISTERED

## 2022-12-22 PROCEDURE — 81025 URINE PREGNANCY TEST: CPT | Performed by: ANESTHESIOLOGY

## 2022-12-22 PROCEDURE — 45385 COLONOSCOPY W/LESION REMOVAL: CPT | Performed by: INTERNAL MEDICINE

## 2022-12-22 PROCEDURE — 45380 COLONOSCOPY AND BIOPSY: CPT | Performed by: INTERNAL MEDICINE

## 2022-12-22 RX ORDER — MIDAZOLAM HYDROCHLORIDE 1 MG/ML
1 INJECTION INTRAMUSCULAR; INTRAVENOUS
Status: DISCONTINUED | OUTPATIENT
Start: 2022-12-22 | End: 2022-12-22 | Stop reason: HOSPADM

## 2022-12-22 RX ORDER — ONDANSETRON 2 MG/ML
4 INJECTION INTRAMUSCULAR; INTRAVENOUS AS NEEDED
Status: DISCONTINUED | OUTPATIENT
Start: 2022-12-22 | End: 2022-12-22 | Stop reason: HOSPADM

## 2022-12-22 RX ORDER — BUDESONIDE 28 MG/1
1 AEROSOL, FOAM RECTAL 2 TIMES DAILY
Qty: 33.4 G | Refills: 1 | Status: SHIPPED | OUTPATIENT
Start: 2022-12-22 | End: 2023-01-05

## 2022-12-22 RX ORDER — IPRATROPIUM BROMIDE AND ALBUTEROL SULFATE 2.5; .5 MG/3ML; MG/3ML
3 SOLUTION RESPIRATORY (INHALATION) ONCE AS NEEDED
Status: DISCONTINUED | OUTPATIENT
Start: 2022-12-22 | End: 2022-12-22 | Stop reason: HOSPADM

## 2022-12-22 RX ORDER — FENTANYL CITRATE 50 UG/ML
50 INJECTION, SOLUTION INTRAMUSCULAR; INTRAVENOUS
Status: DISCONTINUED | OUTPATIENT
Start: 2022-12-22 | End: 2022-12-22 | Stop reason: HOSPADM

## 2022-12-22 RX ORDER — SODIUM CHLORIDE 0.9 % (FLUSH) 0.9 %
10 SYRINGE (ML) INJECTION AS NEEDED
Status: DISCONTINUED | OUTPATIENT
Start: 2022-12-22 | End: 2022-12-22 | Stop reason: HOSPADM

## 2022-12-22 RX ORDER — LEVOTHYROXINE SODIUM 0.07 MG/1
75 TABLET ORAL DAILY
COMMUNITY

## 2022-12-22 RX ORDER — LIDOCAINE HYDROCHLORIDE 20 MG/ML
INJECTION, SOLUTION EPIDURAL; INFILTRATION; INTRACAUDAL; PERINEURAL AS NEEDED
Status: DISCONTINUED | OUTPATIENT
Start: 2022-12-22 | End: 2022-12-22 | Stop reason: SURG

## 2022-12-22 RX ORDER — OXYCODONE HYDROCHLORIDE AND ACETAMINOPHEN 5; 325 MG/1; MG/1
1 TABLET ORAL ONCE AS NEEDED
Status: DISCONTINUED | OUTPATIENT
Start: 2022-12-22 | End: 2022-12-22 | Stop reason: HOSPADM

## 2022-12-22 RX ORDER — SODIUM CHLORIDE 0.9 % (FLUSH) 0.9 %
10 SYRINGE (ML) INJECTION EVERY 12 HOURS SCHEDULED
Status: DISCONTINUED | OUTPATIENT
Start: 2022-12-22 | End: 2022-12-22 | Stop reason: HOSPADM

## 2022-12-22 RX ORDER — MEPERIDINE HYDROCHLORIDE 25 MG/ML
12.5 INJECTION INTRAMUSCULAR; INTRAVENOUS; SUBCUTANEOUS
Status: DISCONTINUED | OUTPATIENT
Start: 2022-12-22 | End: 2022-12-22 | Stop reason: HOSPADM

## 2022-12-22 RX ORDER — SODIUM CHLORIDE 9 MG/ML
40 INJECTION, SOLUTION INTRAVENOUS AS NEEDED
Status: DISCONTINUED | OUTPATIENT
Start: 2022-12-22 | End: 2022-12-22 | Stop reason: HOSPADM

## 2022-12-22 RX ORDER — SODIUM CHLORIDE, SODIUM LACTATE, POTASSIUM CHLORIDE, CALCIUM CHLORIDE 600; 310; 30; 20 MG/100ML; MG/100ML; MG/100ML; MG/100ML
100 INJECTION, SOLUTION INTRAVENOUS ONCE AS NEEDED
Status: DISCONTINUED | OUTPATIENT
Start: 2022-12-22 | End: 2022-12-22 | Stop reason: HOSPADM

## 2022-12-22 RX ORDER — PROPOFOL 10 MG/ML
VIAL (ML) INTRAVENOUS CONTINUOUS PRN
Status: DISCONTINUED | OUTPATIENT
Start: 2022-12-22 | End: 2022-12-22 | Stop reason: SURG

## 2022-12-22 RX ORDER — SODIUM CHLORIDE, SODIUM LACTATE, POTASSIUM CHLORIDE, CALCIUM CHLORIDE 600; 310; 30; 20 MG/100ML; MG/100ML; MG/100ML; MG/100ML
125 INJECTION, SOLUTION INTRAVENOUS ONCE
Status: COMPLETED | OUTPATIENT
Start: 2022-12-22 | End: 2022-12-22

## 2022-12-22 RX ADMIN — LIDOCAINE HYDROCHLORIDE 60 MG: 20 INJECTION, SOLUTION EPIDURAL; INFILTRATION; INTRACAUDAL; PERINEURAL at 07:50

## 2022-12-22 RX ADMIN — SODIUM CHLORIDE, POTASSIUM CHLORIDE, SODIUM LACTATE AND CALCIUM CHLORIDE: 600; 310; 30; 20 INJECTION, SOLUTION INTRAVENOUS at 07:49

## 2022-12-22 RX ADMIN — PROPOFOL 200 MCG/KG/MIN: 10 INJECTION, EMULSION INTRAVENOUS at 07:50

## 2022-12-22 NOTE — H&P
[facility]    Patient Identification:  Name:  Genevieve Ness  Age:  42 y.o.  Sex:  female  :  1980  MRN:  [id number]   Visit Number:  [id number]  Primary Care Physician:  Kreis, Samuel Duane, MD       Chief complaint: History of ulcerative colitis with exacerbation    History of presenting illness:  42 y.o. female presents today for colonoscopy secondary to exacerbation of her symptoms related to ulcerative colitis.  The patient is currently on Stelara and taking mesalamine 1 tablet twice daily.  She states that she has a small amount of bleeding with most bowel movements.  She is having multiple bowel movements throughout the day.  She has been treated for C. difficile twice.  She currently is not on vancomycin.  She has not had weight loss.  She describes having very little abdominal pain.  There is no associated nausea or vomiting.  Her last colonoscopy was in March of this year.  She did have a polyp in the sigmoid colon that turned out to be a tubular adenoma.  It was not removed at the time secondary to thinking it could possibly be an inflammatory polyp.  However, biopsies did reveal it to be a tubular adenoma  ---------------------------------------------------------------------------------------------------------------------   Review of Systems   Constitutional: Negative for activity change, appetite change, chills, fatigue, fever and unexpected weight change.   HENT: Negative for mouth sores and trouble swallowing.    Eyes: Negative for photophobia, pain and redness.   Respiratory: Negative for cough and choking.    Cardiovascular: Negative for chest pain and leg swelling.   Gastrointestinal: Positive for abdominal distention, abdominal pain, anal bleeding, blood in stool, diarrhea and nausea. Negative for constipation, rectal pain and vomiting.   Endocrine: Negative for cold intolerance, heat intolerance and polyphagia.   Genitourinary: Negative for  difficulty urinating and dysuria.   Musculoskeletal: Negative for arthralgias, joint swelling and myalgias.   Skin: Negative for rash and wound.   Allergic/Immunologic: Negative for environmental allergies and food allergies.   Neurological: Positive for headaches. Negative for dizziness and light-headedness.   Hematological: Negative for adenopathy. Does not bruise/bleed easily.   Psychiatric/Behavioral: Negative for sleep disturbance. The patient is not nervous/anxious.       ---------------------------------------------------------------------------------------------------------------------   Past Medical History:   Diagnosis Date   • Thyroid disease      Past Surgical History:   Procedure Laterality Date   •  SECTION     • COLONOSCOPY N/A 2022    Procedure: COLONOSCOPY FOR SCREENING;  Surgeon: Vera Lawton MD;  Location: [facility];  Service: Gastroenterology;  Laterality: N/A;   • ENDOSCOPY N/A 2022    Procedure: ESOPHAGOGASTRODUODENOSCOPY WITH BIOPSY;  Surgeon: Vera Lawton MD;  Location: [facility];  Service: Gastroenterology;  Laterality: N/A;   • UPPER GASTROINTESTINAL ENDOSCOPY     • WISDOM TOOTH EXTRACTION       Family History   Problem Relation Age of Onset   • Breast cancer Neg Hx      Social History     Socioeconomic History   • Marital status:    Tobacco Use   • Smoking status: Former   • Smokeless tobacco: Never   Vaping Use   • Vaping Use: Every day   • Substances: Nicotine   • Devices: Disposable   Substance and Sexual Activity   • Alcohol use: Never   • Drug use: Never   • Sexual activity: Yes     Partners: Male     ---------------------------------------------------------------------------------------------------------------------   Allergies:  Patient has no known allergies.  ---------------------------------------------------------------------------------------------------------------------   Prior to Admission Medications      Prescriptions Last Dose Informant Patient Reported? Taking?    levothyroxine (Synthroid) 75 MCG tablet 12/21/2022  Yes Yes    Take 75 mcg by mouth Daily.    mesalamine (Lialda) 1.2 g EC tablet Past Week  No Yes    Take four tablets by mouth all at one time in the morning    polyethylene glycol (MIRALAX) 17 GM/SCOOP powder 12/21/2022  No Yes    Take 510 g by mouth Take As Directed. Place 15 cap fulls of powder in 32 oz of liquid of choice at 4:00 and 10:00 PM    Stelara 90 MG/ML solution prefilled syringe Injection Past Month  No Yes    MAINTENANCE: INJECT 1 SYRINGE SUBCUTANEOUSLY EVERY 8 WEEKS. REFRIGERATE. DO NOT FREEZE.        Hospital Scheduled Meds:  lactated ringers, 125 mL/hr, Intravenous, Once  sodium chloride, 10 mL, Intravenous, Q12H         ---------------------------------------------------------------------------------------------------------------------   Vital Signs:  Temp:  [97.9 °F (36.6 °C)] 97.9 °F (36.6 °C)  Heart Rate:  [98] 98  Resp:  [20] 20  BP: (166)/(96) 166/96      12/22/22  0659   Weight: 129 kg (285 lb)     Body mass index is 42.09 kg/m².  ---------------------------------------------------------------------------------------------------------------------   Physical Exam:  Constitutional:  Well-developed and well-nourished.  No respiratory distress.      Morbid obesity  HENT:  Head: Normocephalic and atraumatic.  Mouth:  Moist mucous membranes.    Eyes:  Conjunctivae and EOM are normal.  Pupils are equal, round, and reactive to light.  No scleral icterus.  Neck:  Neck supple.  No JVD present.    Cardiovascular:  Normal rate, regular rhythm and normal heart sounds with no murmur.  Pulmonary/Chest:  No respiratory distress, no wheezes, no crackles, with normal breath sounds and good air movement.  Abdominal:  Soft.  Bowel sounds are normal.  No distension and no tenderness.   Musculoskeletal:  No edema, no tenderness, and no deformity.  No red or swollen joints anywhere.    Neurological:   Alert and oriented to person, place, and time.  No cranial nerve deficit.  No tongue deviation.  No facial droop.  No slurred speech.   Skin:  Skin is warm and dry.  No rash noted.  No pallor.   Psychiatric:  Normal mood and affect.  Behavior is normal.  Judgment and thought content normal.   Peripheral vascular:  No edema and strong pulses on all 4 extremities.  Genitourinary:  ---------------------------------------------------------------------------------------------------------------------        Invalid input(s): PROTCrCl cannot be calculated (Patient's most recent lab result is older than the maximum 30 days allowed.).  No results found for: AMMONIA          No results found for: HGBA1C  No results found for: TSH, FREET4  No results found for: PREGTESTUR, PREGSERUM, HCG, HCGQUANT  Pain Management Panel    There is no flowsheet data to display.       No results found for: BLOODCX  No results found for: URINECX  No results found for: WOUNDCX  No results found for: STOOLCX      ---------------------------------------------------------------------------------------------------------------------  Imaging Results (Last 7 Days)     ** No results found for the last 168 hours. **          I have personally reviewed the radiology images and read the final radiology report.  ---------------------------------------------------------------------------------------------------------------------  Assessment and Plan:  Proceed with colonoscopy to evaluate disease activity in the setting of ulcerative colitis.    Vera Lawton MD  12/22/22  07:36 EST

## 2022-12-22 NOTE — ANESTHESIA PREPROCEDURE EVALUATION
Anesthesia Evaluation     Patient summary reviewed and Nursing notes reviewed   no history of anesthetic complications:  NPO Solid Status: > 8 hours  NPO Liquid Status: > 8 hours           Airway   Mallampati: II  TM distance: >3 FB  Neck ROM: full  Dental - normal exam     Pulmonary - negative pulmonary ROS and normal exam   Cardiovascular - negative cardio ROS and normal exam        Neuro/Psych- negative ROS  GI/Hepatic/Renal/Endo    (+)   thyroid problem     Musculoskeletal (-) negative ROS    Abdominal  - normal exam   Substance History - negative use     OB/GYN negative ob/gyn ROS         Other   autoimmune disease (Ulcerative Colitis) ,                      Anesthesia Plan    ASA 3     general     intravenous induction     Anesthetic plan, risks, benefits, and alternatives have been provided, discussed and informed consent has been obtained with: patient.    Use of blood products discussed with patient  Consented to blood products.       CODE STATUS:      FULL    Lab Results   Component Value Date    WBC 9.15 07/05/2022    HGB 11.6 (L) 07/05/2022    HCT 37.1 07/05/2022    MCV 76.2 (L) 07/05/2022     07/05/2022       Lab Results   Component Value Date    GLUCOSE 111 (H) 07/05/2022    BUN 8 07/05/2022    CREATININE 0.67 07/05/2022    BCR 11.9 07/05/2022    K 4.5 07/05/2022    CO2 23.7 07/05/2022    CALCIUM 10.1 07/05/2022    ALBUMIN 4.20 07/05/2022    AST 7 07/05/2022    ALT 13 07/05/2022

## 2022-12-22 NOTE — ANESTHESIA POSTPROCEDURE EVALUATION
Patient: Genevieve Ness    Procedure Summary     Date: 12/22/22 Room / Location:  [facility]  /  [facility]    Anesthesia Start: 0749 Anesthesia Stop: 0819    Procedure: COLONOSCOPY Diagnosis:       Ulcerative colitis with complication, unspecified location (HCC)      History of colon polyps      (Ulcerative colitis with complication, unspecified location (HCC) [K51.919])      (History of colon polyps [Z86.010])    Surgeons: Vera Lawton MD Provider: Jeffrey Payton MD    Anesthesia Type: general ASA Status: 3          Anesthesia Type: general    Vitals  Vitals Value Taken Time   /84 12/22/22 0847   Temp 97.2 °F (36.2 °C) 12/22/22 0817   Pulse 69 12/22/22 0847   Resp 20 12/22/22 0847   SpO2 100 % 12/22/22 0847           Post Anesthesia Care and Evaluation    Patient location during evaluation: PACU  Patient participation: complete - patient participated  Level of consciousness: awake  Pain score: 0  Pain management: adequate    Airway patency: patent  Anesthetic complications: No anesthetic complications  PONV Status: none  Cardiovascular status: acceptable  Respiratory status: acceptable  Hydration status: acceptable

## 2022-12-23 LAB — REF LAB TEST METHOD: NORMAL

## 2022-12-26 NOTE — PROGRESS NOTES
At the time of her recent colonoscopy, biopsies were taken of different areas of your colon to assess disease activity.  Biopsies of the ascending colon showed mild chronic active colitis, biopsies of the descending colon showed mild chronic active colitis biopsies of the rectosigmoid colon showed mild chronic active colitis.  I have started you on Uceris foam to help decrease inflammation.  I suspect the longer you have are on Stelara the less inflammation you will have.  The polyps removed from your colon were tubular adenomas which are considered precancerous.  You will need repeat colonoscopy in 3 years.  These keep your follow-up appointment.

## 2023-02-08 ENCOUNTER — TELEPHONE (OUTPATIENT)
Dept: GASTROENTEROLOGY | Facility: CLINIC | Age: 43
End: 2023-02-08

## 2023-02-09 NOTE — TELEPHONE ENCOUNTER
11/10/2020    Kedar Richard (:  1974) is a 39 y.o. male, here for evaluation of the following medical concerns:    HPI    Has been seeing [facility], but has not seen in several years. Left shoulder injury  Numbness from elbow to tip of thumb. Started 5 days ago. Has had recurrent problems. Can intermittently lose strength. Has remote shoulder injury years ago. Has had cortisone injections which have helped in the past. Has not been evaluated by ortho or done offical PT. Has done home exerises. Has not had imagine in years. Believes he had X-rays. Works as  with heavy lifting, has decreased lifting some this past year. Diet: Admits that diet is worse than it should be, tries to eat balanced diet with vegetables and fruits. Exercise: Previously exercised an hour+ daily. Has not done this in the past year due to work time restrictions. Has purchased home gy to start again. Weight: Has gained 10-15 lbs in last 6 months. Review of Systems   Constitutional: Negative for chills and fever. HENT: Positive for tinnitus (has been worsening over a long time). Eyes: Negative for visual disturbance. Respiratory: Negative for shortness of breath. Cardiovascular: Negative for chest pain and palpitations. Gastrointestinal: Negative for constipation, diarrhea, nausea and vomiting. Genitourinary: Negative for difficulty urinating. Musculoskeletal: Negative for gait problem. Skin: Negative for rash. Neurological: Negative for dizziness and light-headedness. Psychiatric/Behavioral: Negative for confusion and decreased concentration. Prior to Visit Medications    Medication Sig Taking? Authorizing Provider   meloxicam (MOBIC) 15 MG tablet Take 1 tablet by mouth daily Yes Tawanda Lin,         No Known Allergies    History reviewed. No pertinent past medical history. History reviewed. No pertinent surgical history.     Social History Left message letting patient know information.   Socioeconomic History    Marital status:      Spouse name: Not on file    Number of children: Not on file    Years of education: Not on file    Highest education level: Not on file   Occupational History    Not on file   Social Needs    Financial resource strain: Not on file    Food insecurity     Worry: Not on file     Inability: Not on file    Transportation needs     Medical: Not on file     Non-medical: Not on file   Tobacco Use    Smoking status: Never Smoker    Smokeless tobacco: Never Used   Substance and Sexual Activity    Alcohol use: Yes     Comment: couple beers a week    Drug use: Not on file    Sexual activity: Yes     Partners: Female   Lifestyle    Physical activity     Days per week: Not on file     Minutes per session: Not on file    Stress: Not on file   Relationships    Social connections     Talks on phone: Not on file     Gets together: Not on file     Attends [employer] service: Not on file     Active member of club or organization: Not on file     Attends meetings of clubs or organizations: Not on file     Relationship status: Not on file    Intimate partner violence     Fear of current or ex partner: Not on file     Emotionally abused: Not on file     Physically abused: Not on file     Forced sexual activity: Not on file   Other Topics Concern    Not on file   Social History Narrative    Not on file        Family History   Problem Relation Age of Onset    No Known Problems Mother     No Known Problems Father        Vitals:    11/10/20 0844   BP: 130/80   Site: Left Upper Arm   Position: Sitting   Cuff Size: Large Adult   Pulse: 60   Temp: 97.3 °F (36.3 °C)   TempSrc: Temporal   SpO2: 97%   Weight: 287 lb 12.8 oz (130.5 kg)   Height: 6' (1.829 m)     Estimated body mass index is 39.03 kg/m² as calculated from the following:    Height as of this encounter: 6' (1.829 m). Weight as of this encounter: 287 lb 12.8 oz (130.5 kg).     Physical Exam  Vitals signs (BOOSTRIX)    4. Class 2 obesity due to excess calories without serious comorbidity with body mass index (BMI) of 39.0 to 39.9 in adult  Discussed diet and exercise. Patient reports that he was previously more active however with new job and gym shutdown secondary to Covid, patient has been unable to work out as he had previously. Plans to restart. - Comprehensive Metabolic Panel; Future  - CBC Auto Differential; Future  - Hemoglobin A1C; Future  - Lipid Panel; Future  - TSH with Reflex; Future    5. Osteoarthritis of left shoulder due to rotator cuff injury  Patient reports recurrent issues with osteoarthritis of left shoulder. Does not remember inciting injury. Has been treated in the past with cortisone injections as well as NSAIDs. Reports that this provides temporary relief. However this time patient reports that for the last 5 days has had decreased sensation along the C6 dermatome into the left thumb. Given recurrent nature, neurologic symptoms, will refer to Ortho for further evaluation and treatment at this time. - meloxicam (MOBIC) 15 MG tablet; Take 1 tablet by mouth daily  Dispense: 30 tablet; Refill: 3  - Savita Bautista MD, Orthopedic Surgery, [facility]      No follow-ups on file. An  electronic signature was used to authenticate this note.     --Ino Tenorio, DO on 11/10/2020 at 10:59 AM

## 2023-03-31 ENCOUNTER — OFFICE VISIT (OUTPATIENT)
Dept: GASTROENTEROLOGY | Facility: CLINIC | Age: 43
End: 2023-03-31
Payer: COMMERCIAL

## 2023-03-31 VITALS — BODY MASS INDEX: 42.69 KG/M2 | HEIGHT: 69 IN | WEIGHT: 288.2 LBS

## 2023-03-31 DIAGNOSIS — K51.919 ULCERATIVE COLITIS WITH COMPLICATION, UNSPECIFIED LOCATION: Primary | ICD-10-CM

## 2023-03-31 PROCEDURE — 99213 OFFICE O/P EST LOW 20 MIN: CPT | Performed by: PHYSICIAN ASSISTANT

## 2023-03-31 RX ORDER — AZATHIOPRINE 50 MG/1
50 TABLET ORAL DAILY
Qty: 30 TABLET | Refills: 2 | Status: SHIPPED | OUTPATIENT
Start: 2023-03-31

## 2023-03-31 NOTE — PROGRESS NOTES
"DATE:  4/6/2023    DIAGNOSIS: Ulcerative colitis    CHIEF COMPLAINT:  Chief Complaint   Patient presents with   • Ulcerative Colitis        HPI  Mrs. Ness is a 42-year-old female who presents today for a follow-up for ulcerative colitis.     The patient reports she was doing really good at first, but it started getting worse again. She states it is a little better today. She notes she got the shot of Stelara on 10/06/2022. She reports she tried to go to work the next, but she couldn't tell if it made a difference until \"maybe today.\" The patient states she started feeling worse again around 10/01/2022 or 10/02/2022. She notes on 10/03/2022 and 10/04/2022 she started bleeding again. The patient reports the loose stool and diarrhea and frequency is not like it was prior. She states she isn't sure if the c. difficile is coming back. She notes the Stelara works. She reports she is still on antibiotics for the c. difficile . The patient states the bleeding is not so bad that it is dripping. She notes her stool is more of a phlegm with specks of dark blood. Her last Stelara shot was on 10/06/2022. The patient reports having abdomen pain with it, but it resolved. She states she took Imodium yesterday, 10/09/2022. She notes she has only gone twice this morning. She reports there is less bleeding. The patient states she has to go out of town on Wednesday, 10/12/2022. She notes she is getting mouth spres that take a while to heal.      Interval History:  Patient states that she has been doing somewhat well since being seen in clinic last.  She underwent her colonoscopy in December that did show significant improvement in ulcerative colitis.  Patient went from Gray score 3 to Gray score 1.  She is currently on Stelara injections which she is tolerating well.  She still continues to have a lot of issues with abdominal pain and diarrhea but it does seem to slowly be improving.  Dr. Ngo did start her on her mesalamine after " her colonoscopy however patient states she is not able to tolerate it-abdominal pain seems to significantly worsen while on it.  She reports occasional episodes of rectal bleeding however typically does not pass any blood clots.     The following portions of the patient's history were reviewed and updated as appropriate: allergies, current medications, past family history, past medical history, past social history, past surgical history and problem list.  REVIEW OF SYSTEMS:   Review of Systems   Constitutional: Negative for fever.   HENT: Negative for trouble swallowing.    Eyes: Negative.    Respiratory: Negative for chest tightness.    Cardiovascular: Negative for chest pain.   Gastrointestinal: Positive for abdominal distention, abdominal pain, anal bleeding, blood in stool, diarrhea, nausea and vomiting. Negative for constipation and rectal pain.   Endocrine: Negative.    Genitourinary: Negative.    Musculoskeletal: Negative.    Skin: Negative.    Allergic/Immunologic: Negative.    Neurological: Positive for headaches.   Hematological: Does not bruise/bleed easily.   Psychiatric/Behavioral: Negative.        PAST MEDICAL HISTORY:  Past Medical History:   Diagnosis Date   • Thyroid disease        PAST SURGICAL HISTORY:  Past Surgical History:   Procedure Laterality Date   •  SECTION     • COLONOSCOPY N/A 2022    Procedure: COLONOSCOPY FOR SCREENING;  Surgeon: Vera Lawton MD;  Location: [facility];  Service: Gastroenterology;  Laterality: N/A;   • COLONOSCOPY N/A 2022    Procedure: COLONOSCOPY;  Surgeon: Vera Lawton MD;  Location: [facility] OR;  Service: Gastroenterology;  Laterality: N/A;   • ENDOSCOPY N/A 2022    Procedure: ESOPHAGOGASTRODUODENOSCOPY WITH BIOPSY;  Surgeon: Vera Lawton MD;  Location: [facility] OR;  Service: Gastroenterology;  Laterality: N/A;   • UPPER GASTROINTESTINAL ENDOSCOPY     • WISDOM TOOTH EXTRACTION         SOCIAL  "HISTORY:  Social History     Socioeconomic History   • Marital status:    Tobacco Use   • Smoking status: Former   • Smokeless tobacco: Never   Vaping Use   • Vaping Use: Every day   • Substances: Nicotine   • Devices: Disposable   Substance and Sexual Activity   • Alcohol use: Never   • Drug use: Never   • Sexual activity: Yes     Partners: Male       FAMILY HISTORY:  Family History   Problem Relation Age of Onset   • Breast cancer Neg Hx        MEDICATIONS:      Current Outpatient Medications:   •  levothyroxine (SYNTHROID, LEVOTHROID) 75 MCG tablet, Take 1 tablet by mouth Daily., Disp: , Rfl:   •  Stelara 90 MG/ML solution prefilled syringe Injection, MAINTENANCE: INJECT 1 SYRINGE SUBCUTANEOUSLY EVERY 8 WEEKS. REFRIGERATE. DO NOT FREEZE., Disp: 1 each, Rfl: 11  •  azaTHIOprine (IMURAN) 50 MG tablet, Take 1 tablet by mouth Daily., Disp: 30 tablet, Rfl: 2    ALLERGIES:  No Known Allergies    VIST VITALS/PHYSICAL EXAM:  Ht 175.3 cm (69\")   Wt 131 kg (288 lb 3.2 oz)   BMI 42.56 kg/m²   Physical Exam  Constitutional:       General: She is not in acute distress.     Appearance: Normal appearance. She is well-developed.   HENT:      Head: Normocephalic and atraumatic.   Eyes:      Pupils: Pupils are equal, round, and reactive to light.   Cardiovascular:      Rate and Rhythm: Normal rate and regular rhythm.      Heart sounds: Normal heart sounds.   Pulmonary:      Effort: Pulmonary effort is normal. No respiratory distress.      Breath sounds: Normal breath sounds. No wheezing, rhonchi or rales.   Abdominal:      General: Abdomen is flat. Bowel sounds are normal. There is no distension.      Palpations: Abdomen is soft. There is no mass.      Tenderness: There is no abdominal tenderness (Generalized). There is no guarding or rebound.      Hernia: No hernia is present.   Musculoskeletal:         General: No swelling. Normal range of motion.      Cervical back: Normal range of motion and neck supple.      Right " lower leg: No edema.      Left lower leg: No edema.   Skin:     General: Skin is warm and dry.   Neurological:      Mental Status: She is alert and oriented to person, place, and time.   Psychiatric:         Attention and Perception: Attention normal.         Mood and Affect: Mood normal.         Speech: Speech normal.         Behavior: Behavior normal. Behavior is cooperative.         Thought Content: Thought content normal.           PATHOLOGY:  TISSUE EXAM, [facility] ([facility]) (12/22/2022 08:00)      ENDOSCOPY:  COLONOSCOPY (12/22/2022 07:26)        IMAGING:  No Images in the past 120 days found..        RECENT LABS:  Lab Results   Component Value Date    WBC 9.15 07/05/2022    HGB 11.6 (L) 07/05/2022    HCT 37.1 07/05/2022    MCV 76.2 (L) 07/05/2022    RDW 15.0 07/05/2022     07/05/2022    NEUTRORELPCT 78.2 (H) 07/05/2022    LYMPHORELPCT 16.4 (L) 07/05/2022    MONORELPCT 4.3 (L) 07/05/2022    EOSRELPCT 0.4 07/05/2022    BASORELPCT 0.2 07/05/2022    NEUTROABS 7.15 (H) 07/05/2022    LYMPHSABS 1.50 07/05/2022       Lab Results   Component Value Date     07/05/2022    K 4.5 07/05/2022    CO2 23.7 07/05/2022     07/05/2022    BUN 8 07/05/2022    CREATININE 0.67 07/05/2022    GLUCOSE 111 (H) 07/05/2022    CALCIUM 10.1 07/05/2022    ALKPHOS 50 07/05/2022    AST 7 07/05/2022    ALT 13 07/05/2022    BILITOT 0.2 07/05/2022    ALBUMIN 4.20 07/05/2022    PROTEINTOT 7.2 07/05/2022             ASSESSMENT & PLAN:  Patient's recent colonoscopy results were reviewed with her-she voiced understanding of these results.  We will continue on her Stelara injections as her ulcerative colitis has significantly improved.  She was started back on mesalamine by Dr. Herrera however patient states that when she takes medication it seems to worsen symptoms.  We will discontinue medication and try trial of azathioprine 50 mg daily.  We will also continue her Stelara injections.   Diagnosis Plan   1. Ulcerative colitis  with complication, unspecified location  azaTHIOprine (IMURAN) 50 MG tablet          Return in about 6 months (around 9/30/2023).        Electronically Signed by: Charlene Johnson PA-C , April 6, 2023 14:26 EDT       CC:   Kreis, Samuel Duane, MD

## 2023-04-11 ENCOUNTER — TELEPHONE (OUTPATIENT)
Dept: GASTROENTEROLOGY | Facility: CLINIC | Age: 43
End: 2023-04-11

## 2023-04-11 DIAGNOSIS — Z86.19 HISTORY OF CLOSTRIDIOIDES DIFFICILE COLITIS: Primary | ICD-10-CM

## 2023-04-11 NOTE — TELEPHONE ENCOUNTER
Patient called in, she is concerned that she may have c-diff again. Watery stool. Happened since taking azathioprine. Please call to advise if patient needs to stop meds.

## 2023-04-12 NOTE — TELEPHONE ENCOUNTER
Spoke with patient and gave her information. She said she stopped taking it yesterday and has felt better since that. I let her know that the c-diff order is in her chart if she decides to do it. She voiced understanding.

## 2023-04-17 ENCOUNTER — TELEPHONE (OUTPATIENT)
Dept: GASTROENTEROLOGY | Facility: CLINIC | Age: 43
End: 2023-04-17
Payer: COMMERCIAL

## 2023-04-17 ENCOUNTER — LAB (OUTPATIENT)
Dept: LAB | Facility: HOSPITAL | Age: 43
End: 2023-04-17
Payer: COMMERCIAL

## 2023-04-17 LAB
027 TOXIN: NORMAL
C DIFF TOX GENS STL QL NAA+PROBE: NEGATIVE

## 2023-04-17 PROCEDURE — 87493 C DIFF AMPLIFIED PROBE: CPT | Performed by: PHYSICIAN ASSISTANT

## 2023-04-17 NOTE — TELEPHONE ENCOUNTER
Patient states that she did stop taking the medication. She said that for the most part she is feeling better and the rectal bleeding isn't as bad as it was but she is still having watery diarrhea. She said that the only other thing she has changed is that she is now taking a multi vitamin.

## 2023-05-08 ENCOUNTER — TELEPHONE (OUTPATIENT)
Dept: GASTROENTEROLOGY | Facility: CLINIC | Age: 43
End: 2023-05-08

## 2023-05-18 ENCOUNTER — OFFICE VISIT (OUTPATIENT)
Dept: GASTROENTEROLOGY | Facility: CLINIC | Age: 43
End: 2023-05-18
Payer: COMMERCIAL

## 2023-05-18 VITALS
HEART RATE: 84 BPM | SYSTOLIC BLOOD PRESSURE: 155 MMHG | BODY MASS INDEX: 39.99 KG/M2 | DIASTOLIC BLOOD PRESSURE: 91 MMHG | HEIGHT: 69 IN | WEIGHT: 270 LBS

## 2023-05-18 DIAGNOSIS — K51.919 ULCERATIVE COLITIS WITH COMPLICATION, UNSPECIFIED LOCATION: Primary | ICD-10-CM

## 2023-05-18 PROCEDURE — 85007 BL SMEAR W/DIFF WBC COUNT: CPT | Performed by: NURSE PRACTITIONER

## 2023-05-18 PROCEDURE — 82728 ASSAY OF FERRITIN: CPT | Performed by: NURSE PRACTITIONER

## 2023-05-18 PROCEDURE — 86140 C-REACTIVE PROTEIN: CPT | Performed by: NURSE PRACTITIONER

## 2023-05-18 PROCEDURE — 84466 ASSAY OF TRANSFERRIN: CPT | Performed by: NURSE PRACTITIONER

## 2023-05-18 PROCEDURE — 85025 COMPLETE CBC W/AUTO DIFF WBC: CPT | Performed by: NURSE PRACTITIONER

## 2023-05-18 PROCEDURE — 83540 ASSAY OF IRON: CPT | Performed by: NURSE PRACTITIONER

## 2023-05-18 RX ORDER — ONDANSETRON 4 MG/1
4 TABLET, ORALLY DISINTEGRATING ORAL EVERY 8 HOURS PRN
Qty: 30 TABLET | Refills: 3 | Status: SHIPPED | OUTPATIENT
Start: 2023-05-18

## 2023-05-18 RX ORDER — MESALAMINE 1.2 G/1
TABLET, DELAYED RELEASE ORAL
Qty: 120 TABLET | Refills: 11 | Status: SHIPPED | OUTPATIENT
Start: 2023-05-18

## 2023-05-18 NOTE — PROGRESS NOTES
DATE:  2023    DIAGNOSIS: Ulcerative colitis     CHIEF COMPLAINT:  Follow up of UC    Interval History:  Ms. Ness presents today for follow up. Since her last visit, she was started on a trial of azathioprine 50 mg daily due to poor tolerance of mesalamine (nausea). Unfortunately, she says with imuran, she developed severe diarrhea (~15 episodes/day) along with bloody stools and increased abdominal pain. She was afraid she might have recurrent c-diff and was tested but this was negative. She decided to resume mesalamine at the beginning of May, 4 tabs/daily. Happily, she has had improvement in diarrhea, abdominal pain and bloody stools. At present she is having diarrhea ~5 times/day. She also reports nausea which she thinks is related to the mesalamine. She remains on Stelara injections which she is tolerating well.  She underwent repeat colonoscopy in 2022 which showed significant improvement in ulcerative colitis.  Patient went from Gray score 3 to Gray score 1.  She has lost ~18 lbs since March appointment. She has no other complaints today.     PAST MEDICAL HISTORY:  Past Medical History:   Diagnosis Date   • Thyroid disease        PAST SURGICAL HISTORY:  Past Surgical History:   Procedure Laterality Date   •  SECTION     • COLONOSCOPY N/A 2022    Procedure: COLONOSCOPY FOR SCREENING;  Surgeon: Vera Lawton MD;  Location: [facility];  Service: Gastroenterology;  Laterality: N/A;   • COLONOSCOPY N/A 2022    Procedure: COLONOSCOPY;  Surgeon: Vera Lawton MD;  Location: [facility] OR;  Service: Gastroenterology;  Laterality: N/A;   • ENDOSCOPY N/A 2022    Procedure: ESOPHAGOGASTRODUODENOSCOPY WITH BIOPSY;  Surgeon: Vera Lawton MD;  Location: [facility] OR;  Service: Gastroenterology;  Laterality: N/A;   • UPPER GASTROINTESTINAL ENDOSCOPY     • WISDOM TOOTH EXTRACTION         SOCIAL HISTORY:  Social History     Socioeconomic History   •  Marital status:    Tobacco Use   • Smoking status: Former   • Smokeless tobacco: Never   Vaping Use   • Vaping Use: Every day   • Substances: Nicotine   • Devices: Disposable   Substance and Sexual Activity   • Alcohol use: Never   • Drug use: Never   • Sexual activity: Yes     Partners: Male       FAMILY HISTORY:  Family History   Problem Relation Age of Onset   • Breast cancer Neg Hx        MEDICATIONS:  The current medication list was reviewed in the EMR    Current Outpatient Medications:   •  azaTHIOprine (IMURAN) 50 MG tablet, Take 1 tablet by mouth Daily., Disp: 30 tablet, Rfl: 2  •  levothyroxine (SYNTHROID, LEVOTHROID) 75 MCG tablet, Take 1 tablet by mouth Daily., Disp: , Rfl:   •  Stelara 90 MG/ML solution prefilled syringe Injection, MAINTENANCE: INJECT 1 SYRINGE SUBCUTANEOUSLY EVERY 8 WEEKS. REFRIGERATE. DO NOT FREEZE., Disp: 1 each, Rfl: 11    ALLERGIES:  No Known Allergies      REVIEW OF SYSTEMS:    A comprehensive 14 point review of systems was performed.  Significant findings as mentioned above.  All other systems reviewed and are negative.        Physical Exam   Vital Signs:   Vitals:    05/18/23 1404   BP: 155/91   Pulse: 84     General: Well developed, well nourished, alert and oriented x 3, in no acute distress.   Head: ATNC   Eyes: PERRL, No evidence of conjunctivitis.   Nose: No nasal discharge.   Mouth: Oral mucosal membranes moist. No oral ulceration or hemorrhages.   Neck: Neck supple. No thyromegaly. No JVD.   Lungs: Clear in all fields to A&P without rales, rhonchi or wheezing.   Heart: RRR. No murmurs, rubs, or gallops.   Abdomen: Soft. Bowel sounds are normoactive. Nontender with palpation.  Extremities: No cyanosis or edema.  Neurologic: MS as above. Grossly non focal exam.    IMAGING:  No Images in the past 120 days found..      RECENT LABS:  Lab Results   Component Value Date    WBC 9.15 07/05/2022    HGB 11.6 (L) 07/05/2022    HCT 37.1 07/05/2022    MCV 76.2 (L) 07/05/2022     RDW 15.0 07/05/2022     07/05/2022    NEUTRORELPCT 78.2 (H) 07/05/2022    LYMPHORELPCT 16.4 (L) 07/05/2022    MONORELPCT 4.3 (L) 07/05/2022    EOSRELPCT 0.4 07/05/2022    BASORELPCT 0.2 07/05/2022    NEUTROABS 7.15 (H) 07/05/2022    LYMPHSABS 1.50 07/05/2022       Lab Results   Component Value Date     07/05/2022    K 4.5 07/05/2022    CO2 23.7 07/05/2022     07/05/2022    BUN 8 07/05/2022    CREATININE 0.67 07/05/2022    GLUCOSE 111 (H) 07/05/2022    CALCIUM 10.1 07/05/2022    ALKPHOS 50 07/05/2022    AST 7 07/05/2022    ALT 13 07/05/2022    BILITOT 0.2 07/05/2022    ALBUMIN 4.20 07/05/2022    PROTEINTOT 7.2 07/05/2022         ASSESSMENT & PLAN:  Genevieve Ness is a very pleasant 42 y.o. female with    1.  Ulcerative Colitis:  -Patient has been on Stelara injections which she has tolerated well. She underwent repeat colonoscopy in December 2022 which showed significant improvement in ulcerative colitis.  Patient went from Gray score 3 to Gray score 1.   - She was given a trial of azathioprine 50 mg daily due to poor tolerance of mesalamine (nausea). Unfortunately, she says she could not tolerate this either. She resumed mesalamine at the beginning of May, 4 tabs/daily. Happily, she has had improvement in diarrhea, abdominal pain and bloody stools but continues with diarrhea ~5 times/day. She also has nausea which she thinks is related to the mesalamine. She has lost ~18 lbs since March appointment.   -We discussed alternative treatment options today including changing treatment to Entyvio. However, given she is tolerating stelara injections well, conveinence of injection at home and she had improvement on previous colonoscopy, she would like to continue with this treatment for now. If she cannot tolerate Mesalamine 4 tablets daily, she can decrease to maintenance dose, 2 tablets daily. Will refill today. Will also give Rx for zofran prn.  -Will check CBC, Iron panel, Ferritin and CRP today.   -RTC  in 3 months or sooner if needed.         Electronically Signed by: JACK Prela , JACK May 18, 2023 11:08 EDT       CC:     Kreis, Samuel Duane, MD

## 2023-05-19 ENCOUNTER — TELEPHONE (OUTPATIENT)
Dept: GASTROENTEROLOGY | Facility: CLINIC | Age: 43
End: 2023-05-19
Payer: COMMERCIAL

## 2023-05-19 DIAGNOSIS — D50.0 IRON DEFICIENCY ANEMIA DUE TO CHRONIC BLOOD LOSS: Primary | ICD-10-CM

## 2023-05-19 LAB
ANISOCYTOSIS BLD QL: ABNORMAL
CRP SERPL-MCNC: 0.41 MG/DL (ref 0–0.5)
DEPRECATED RDW RBC AUTO: 45 FL (ref 37–54)
ERYTHROCYTE [DISTWIDTH] IN BLOOD BY AUTOMATED COUNT: 17.1 % (ref 12.3–15.4)
FERRITIN SERPL-MCNC: 6.78 NG/ML (ref 13–150)
HCT VFR BLD AUTO: 29 % (ref 34–46.6)
HGB BLD-MCNC: 8.2 G/DL (ref 12–15.9)
IRON 24H UR-MRATE: 19 MCG/DL (ref 37–145)
IRON SATN MFR SERPL: 5 % (ref 20–50)
LYMPHOCYTES # BLD MANUAL: 1.48 10*3/MM3 (ref 0.7–3.1)
LYMPHOCYTES NFR BLD MANUAL: 6.3 % (ref 5–12)
MCH RBC QN AUTO: 20.6 PG (ref 26.6–33)
MCHC RBC AUTO-ENTMCNC: 28.3 G/DL (ref 31.5–35.7)
MCV RBC AUTO: 72.9 FL (ref 79–97)
MICROCYTES BLD QL: ABNORMAL
MONOCYTES # BLD: 0.4 10*3/MM3 (ref 0.1–0.9)
NEUTROPHILS # BLD AUTO: 4.5 10*3/MM3 (ref 1.7–7)
NEUTROPHILS NFR BLD MANUAL: 70.5 % (ref 42.7–76)
NRBC SPEC MANUAL: 1.1 /100 WBC (ref 0–0.2)
PLAT MORPH BLD: NORMAL
PLATELET # BLD AUTO: 292 10*3/MM3 (ref 140–450)
POIKILOCYTOSIS BLD QL SMEAR: ABNORMAL
POLYCHROMASIA BLD QL SMEAR: ABNORMAL
RBC # BLD AUTO: 3.98 10*6/MM3 (ref 3.77–5.28)
TIBC SERPL-MCNC: 416 MCG/DL (ref 298–536)
TRANSFERRIN SERPL-MCNC: 279 MG/DL (ref 200–360)
VARIANT LYMPHS NFR BLD MANUAL: 23.2 % (ref 19.6–45.3)
WBC MORPH BLD: NORMAL
WBC NRBC COR # BLD: 6.38 10*3/MM3 (ref 3.4–10.8)

## 2023-05-19 RX ORDER — IRON POLYSACCHARIDE COMPLEX 150 MG
150 CAPSULE ORAL DAILY
Qty: 30 CAPSULE | Refills: 3 | Status: SHIPPED | OUTPATIENT
Start: 2023-05-19

## 2023-07-22 NOTE — TELEPHONE ENCOUNTER
Called patient no answer. I left a message on her voicemail,  per Sherron she is anemic secondary to iron deficiency. I would like her to start oral iron therapy, Niferex 150 mg PO daily. Recommend to take this at night. Rx sent to [facility]. Call office with any questions or concerns.  
Please let Ms. Ness know she is anemic secondary to iron deficiency. I would like her to start oral iron therapy, Niferex 150 mg PO daily. Recommend to take this at night. Rx sent to [facility].   
English

## 2023-08-16 NOTE — PROGRESS NOTES
DATE:  8/18/2023    DIAGNOSIS: Ulcerative colitis    CHIEF COMPLAINT:  Chief Complaint   Patient presents with    Ulcerative Colitis     HPI:  Ms. Ness presents today for follow up. Since her last visit, she was started on a trial of azathioprine 50 mg daily due to poor tolerance of mesalamine (nausea). Unfortunately, she says with imuran, she developed severe diarrhea (~15 episodes/day) along with bloody stools and increased abdominal pain. She was afraid she might have recurrent c-diff and was tested but this was negative. She decided to resume mesalamine at the beginning of May, 4 tabs/daily. Happily, she has had improvement in diarrhea, abdominal pain and bloody stools. At present she is having diarrhea ~5 times/day. She also reports nausea which she thinks is related to the mesalamine. She remains on Stelara injections which she is tolerating well.  She underwent repeat colonoscopy in December 2022 which showed significant improvement in ulcerative colitis.  Patient went from Gray score 3 to Gray score 1.  She has lost ~18 lbs since March appointment. She has no other complaints today.       Interval History:  Patient reports doing no better since she was seen in clinic by Sherron SANTIAGO.  She continues to have significant abdominal pain and diarrhea.  She is having upwards of 15 small-volume bowel movements daily that are type 6-7 on the Ramsey stool scale.  She admits to seeing both blood and mucus occasionally with bowel movements. She is now unable to tolerate the mesalamine that she was previously doing okay with.  She has been taking her Stelara injections as directed.  She does have previous failure of Humira and Entyvio.  She did undergo colonoscopy in December 2022 that revealed Gray score 1 which was significantly improved from previous colonoscopy.  Patient reports she tries to improve symptoms by decreasing her food intake which does slow down diarrhea somewhat but it continues.  Patient  states she will go 4 to 5 days without eating just for some mild improvement.    The following portions of the patient's history were reviewed and updated as appropriate: allergies, current medications, past family history, past medical history, past social history, past surgical history and problem list.  REVIEW OF SYSTEMS:   Review of Systems   Constitutional:  Negative for fever.   HENT:  Negative for trouble swallowing.    Eyes: Negative.    Respiratory:  Negative for chest tightness.    Cardiovascular:  Negative for chest pain.   Gastrointestinal:  Positive for abdominal distention, abdominal pain, anal bleeding, blood in stool, diarrhea, nausea and vomiting. Negative for constipation and rectal pain.   Endocrine: Negative.    Genitourinary: Negative.    Musculoskeletal: Negative.    Skin: Negative.    Allergic/Immunologic: Negative.    Neurological:  Positive for headaches.   Hematological:  Does not bruise/bleed easily.   Psychiatric/Behavioral: Negative.       PAST MEDICAL HISTORY:  Past Medical History:   Diagnosis Date    Thyroid disease        PAST SURGICAL HISTORY:  Past Surgical History:   Procedure Laterality Date     SECTION      COLONOSCOPY N/A 2022    Procedure: COLONOSCOPY FOR SCREENING;  Surgeon: Vera Lawton MD;  Location: [facility];  Service: Gastroenterology;  Laterality: N/A;    COLONOSCOPY N/A 2022    Procedure: COLONOSCOPY;  Surgeon: Vera Lawton MD;  Location: [facility] OR;  Service: Gastroenterology;  Laterality: N/A;    ENDOSCOPY N/A 2022    Procedure: ESOPHAGOGASTRODUODENOSCOPY WITH BIOPSY;  Surgeon: Vera Lawton MD;  Location: [facility] OR;  Service: Gastroenterology;  Laterality: N/A;    UPPER GASTROINTESTINAL ENDOSCOPY      WISDOM TOOTH EXTRACTION         SOCIAL HISTORY:  Social History     Socioeconomic History    Marital status:    Tobacco Use    Smoking status: Former    Smokeless tobacco: Never   Vaping Use  "   Vaping Use: Every day    Substances: Nicotine    Devices: Disposable   Substance and Sexual Activity    Alcohol use: Never    Drug use: Never    Sexual activity: Yes     Partners: Male       FAMILY HISTORY:  Family History   Problem Relation Age of Onset    Breast cancer Neg Hx        MEDICATIONS:      Current Outpatient Medications:     iron polysaccharides (NIFEREX) 150 MG capsule, Take 1 capsule by mouth Daily., Disp: 30 capsule, Rfl: 3    levothyroxine (SYNTHROID, LEVOTHROID) 75 MCG tablet, Take 1 tablet by mouth Daily., Disp: , Rfl:     mesalamine (Lialda) 1.2 g EC tablet, Take four tablets by mouth all at one time in the morning, Disp: 120 tablet, Rfl: 11    ondansetron ODT (ZOFRAN-ODT) 4 MG disintegrating tablet, Place 1 tablet on the tongue Every 8 (Eight) Hours As Needed for Nausea., Disp: 30 tablet, Rfl: 3    Stelara 90 MG/ML solution prefilled syringe Injection, MAINTENANCE: INJECT 1 SYRINGE SUBCUTANEOUSLY EVERY 8 WEEKS. REFRIGERATE. DO NOT FREEZE., Disp: 1 each, Rfl: 11    ALLERGIES:  No Known Allergies    VIST VITALS/PHYSICAL EXAM:  /94   Pulse 77   Ht 175.3 cm (69\")   Wt 116 kg (256 lb 12.8 oz)   BMI 37.92 kg/mý   Physical Exam  Constitutional:       General: She is not in acute distress.     Appearance: Normal appearance. She is well-developed.   HENT:      Head: Normocephalic and atraumatic.   Eyes:      Pupils: Pupils are equal, round, and reactive to light.   Pulmonary:      Effort: Pulmonary effort is normal. No respiratory distress.   Abdominal:      General: Abdomen is flat. There is no distension.      Palpations: Abdomen is soft. There is no mass.      Tenderness: There is abdominal tenderness (Generalized). There is no guarding or rebound.      Hernia: No hernia is present.   Musculoskeletal:         General: No swelling. Normal range of motion.      Cervical back: Normal range of motion.   Skin:     General: Skin is warm and dry.   Neurological:      Mental Status: She is alert " and oriented to person, place, and time.   Psychiatric:         Attention and Perception: Attention normal.         Mood and Affect: Mood normal.         Speech: Speech normal.         Behavior: Behavior normal. Behavior is cooperative.         Thought Content: Thought content normal.         PATHOLOGY:  TISSUE EXAM, [facility] ([facility]) (12/22/2022 08:00)   Tissue Pathology Exam (03/08/2022 12:40)       ENDOSCOPY:  UPPER GI ENDOSCOPY (03/08/2022 12:37)   COLONOSCOPY (03/08/2022 12:45)   COLONOSCOPY (12/22/2022 07:26)     IMAGING:  No Images in the past 120 days found..        RECENT LABS:  Lab Results   Component Value Date    WBC 6.38 05/18/2023    HGB 8.2 (L) 05/18/2023    HCT 29.0 (L) 05/18/2023    MCV 72.9 (L) 05/18/2023    RDW 17.1 (H) 05/18/2023     05/18/2023    NEUTRORELPCT 78.2 (H) 07/05/2022    LYMPHORELPCT 16.4 (L) 07/05/2022    MONORELPCT 4.3 (L) 07/05/2022    EOSRELPCT 0.4 07/05/2022    BASORELPCT 0.2 07/05/2022    NEUTROABS 4.50 05/18/2023    LYMPHSABS 1.50 07/05/2022       Lab Results   Component Value Date     07/05/2022    K 4.5 07/05/2022    CO2 23.7 07/05/2022     07/05/2022    BUN 8 07/05/2022    CREATININE 0.67 07/05/2022    GLUCOSE 111 (H) 07/05/2022    CALCIUM 10.1 07/05/2022    ALKPHOS 50 07/05/2022    AST 7 07/05/2022    ALT 13 07/05/2022    BILITOT 0.2 07/05/2022    ALBUMIN 4.20 07/05/2022    PROTEINTOT 7.2 07/05/2022             ASSESSMENT & PLAN:  Due to the patient's severity of symptoms and failure of medications-I highly recommended that she seek care at the IBD clinic at [facility].  She is currently scheduled in November.  In the meantime she will continue on her current medication regimen however was told she could discontinue mesalamine.   Diagnosis Plan   1. Ulcerative colitis with complication, unspecified location            Return if symptoms worsen or fail to improve.        Electronically Signed by: Charlene Johnson PA-C , August 18, 2023 15:55 EDT        CC:   Kreis, Samuel Duane, MD

## 2023-08-18 ENCOUNTER — OFFICE VISIT (OUTPATIENT)
Dept: GASTROENTEROLOGY | Facility: CLINIC | Age: 43
End: 2023-08-18
Payer: COMMERCIAL

## 2023-08-18 VITALS
DIASTOLIC BLOOD PRESSURE: 94 MMHG | HEIGHT: 69 IN | SYSTOLIC BLOOD PRESSURE: 165 MMHG | BODY MASS INDEX: 38.03 KG/M2 | HEART RATE: 77 BPM | WEIGHT: 256.8 LBS

## 2023-08-18 DIAGNOSIS — K51.919 ULCERATIVE COLITIS WITH COMPLICATION, UNSPECIFIED LOCATION: Primary | ICD-10-CM

## 2023-08-22 ENCOUNTER — TELEPHONE (OUTPATIENT)
Dept: GASTROENTEROLOGY | Facility: CLINIC | Age: 43
End: 2023-08-22
Payer: COMMERCIAL

## 2023-08-22 DIAGNOSIS — D50.0 IRON DEFICIENCY ANEMIA DUE TO CHRONIC BLOOD LOSS: ICD-10-CM

## 2023-08-22 RX ORDER — IRON POLYSACCHARIDE COMPLEX 150 MG
150 CAPSULE ORAL DAILY
Qty: 30 CAPSULE | Refills: 3 | Status: SHIPPED | OUTPATIENT
Start: 2023-08-22

## 2023-08-22 NOTE — TELEPHONE ENCOUNTER
Patient is requesting a refill on Niferex, she forgot to ask for a refill at her recent office visit. Please send to [facility].

## 2023-08-29 ENCOUNTER — TELEPHONE (OUTPATIENT)
Dept: GASTROENTEROLOGY | Facility: CLINIC | Age: 43
End: 2023-08-29
Payer: COMMERCIAL

## 2023-09-25 ENCOUNTER — TELEPHONE (OUTPATIENT)
Dept: UROLOGY | Facility: CLINIC | Age: 43
End: 2023-09-25

## 2023-09-25 NOTE — TELEPHONE ENCOUNTER
Patient was just wondering if she needed to come to her follow up appointment if she is going to . She said she's not doing any better but she's not doing any worse.

## 2023-10-04 DIAGNOSIS — K51.919 ULCERATIVE COLITIS WITH COMPLICATION, UNSPECIFIED LOCATION: ICD-10-CM

## 2023-10-05 RX ORDER — USTEKINUMAB 90 MG/ML
90 INJECTION, SOLUTION SUBCUTANEOUS TAKE AS DIRECTED
Qty: 1 EACH | Refills: 11 | Status: SHIPPED | OUTPATIENT
Start: 2023-10-05

## 2023-10-31 ENCOUNTER — TELEPHONE (OUTPATIENT)
Dept: GASTROENTEROLOGY | Facility: CLINIC | Age: 43
End: 2023-10-31
Payer: COMMERCIAL

## 2023-10-31 NOTE — TELEPHONE ENCOUNTER
Patient called stating that she has a really bad cold. She is not on any abx or anything. She wanted to k now if it was ok for her to still take her Stelara tomorrow or should she wait a few days.

## 2023-11-09 ENCOUNTER — TRANSCRIBE ORDERS (OUTPATIENT)
Dept: ADMINISTRATIVE | Facility: HOSPITAL | Age: 43
End: 2023-11-09
Payer: COMMERCIAL

## 2023-11-09 ENCOUNTER — LAB (OUTPATIENT)
Dept: LAB | Facility: HOSPITAL | Age: 43
End: 2023-11-09
Payer: COMMERCIAL

## 2023-11-09 DIAGNOSIS — K51.919 ULCERATIVE COLITIS WITH COMPLICATION, UNSPECIFIED LOCATION: Primary | ICD-10-CM

## 2023-11-09 DIAGNOSIS — K51.919 ULCERATIVE COLITIS WITH COMPLICATION, UNSPECIFIED LOCATION: ICD-10-CM

## 2023-11-09 LAB
027 TOXIN: NORMAL
ADV 40+41 DNA STL QL NAA+NON-PROBE: NOT DETECTED
ASTRO TYP 1-8 RNA STL QL NAA+NON-PROBE: NOT DETECTED
C CAYETANENSIS DNA STL QL NAA+NON-PROBE: NOT DETECTED
C COLI+JEJ+UPSA DNA STL QL NAA+NON-PROBE: NOT DETECTED
C DIFF TOX GENS STL QL NAA+PROBE: NEGATIVE
CRYPTOSP DNA STL QL NAA+NON-PROBE: NOT DETECTED
E HISTOLYT DNA STL QL NAA+NON-PROBE: NOT DETECTED
EAEC PAA PLAS AGGR+AATA ST NAA+NON-PRB: NOT DETECTED
EC STX1+STX2 GENES STL QL NAA+NON-PROBE: NOT DETECTED
EPEC EAE GENE STL QL NAA+NON-PROBE: NOT DETECTED
ETEC LTA+ST1A+ST1B TOX ST NAA+NON-PROBE: NOT DETECTED
G LAMBLIA DNA STL QL NAA+NON-PROBE: NOT DETECTED
NOROVIRUS GI+II RNA STL QL NAA+NON-PROBE: NOT DETECTED
P SHIGELLOIDES DNA STL QL NAA+NON-PROBE: NOT DETECTED
RVA RNA STL QL NAA+NON-PROBE: NOT DETECTED
S ENT+BONG DNA STL QL NAA+NON-PROBE: NOT DETECTED
SAPO I+II+IV+V RNA STL QL NAA+NON-PROBE: NOT DETECTED
SHIGELLA SP+EIEC IPAH ST NAA+NON-PROBE: NOT DETECTED
V CHOL+PARA+VUL DNA STL QL NAA+NON-PROBE: NOT DETECTED
V CHOLERAE DNA STL QL NAA+NON-PROBE: NOT DETECTED
Y ENTEROCOL DNA STL QL NAA+NON-PROBE: NOT DETECTED

## 2023-11-09 PROCEDURE — 83993 ASSAY FOR CALPROTECTIN FECAL: CPT

## 2023-11-09 PROCEDURE — 87507 IADNA-DNA/RNA PROBE TQ 12-25: CPT

## 2023-11-09 PROCEDURE — 87493 C DIFF AMPLIFIED PROBE: CPT

## 2023-11-16 LAB — CALPROTECTIN STL-MCNT: 530 UG/G (ref 0–120)

## 2023-12-20 ENCOUNTER — TRANSCRIBE ORDERS (OUTPATIENT)
Dept: ADMINISTRATIVE | Facility: HOSPITAL | Age: 43
End: 2023-12-20
Payer: COMMERCIAL

## 2023-12-20 DIAGNOSIS — R10.84 ABDOMINAL PAIN, GENERALIZED: Primary | ICD-10-CM

## 2023-12-26 ENCOUNTER — TRANSCRIBE ORDERS (OUTPATIENT)
Dept: ADMINISTRATIVE | Facility: HOSPITAL | Age: 43
End: 2023-12-26
Payer: COMMERCIAL

## 2023-12-26 ENCOUNTER — LAB (OUTPATIENT)
Dept: LAB | Facility: HOSPITAL | Age: 43
End: 2023-12-26
Payer: COMMERCIAL

## 2023-12-26 DIAGNOSIS — K51.919 ULCERATIVE COLITIS WITH COMPLICATION, UNSPECIFIED LOCATION: ICD-10-CM

## 2023-12-26 DIAGNOSIS — K51.919 ULCERATIVE COLITIS WITH COMPLICATION, UNSPECIFIED LOCATION: Primary | ICD-10-CM

## 2023-12-26 PROCEDURE — 83993 ASSAY FOR CALPROTECTIN FECAL: CPT

## 2024-01-03 LAB — CALPROTECTIN STL-MCNT: 6720 UG/G (ref 0–120)

## 2024-01-05 ENCOUNTER — HOSPITAL ENCOUNTER (OUTPATIENT)
Facility: HOSPITAL | Age: 44
Discharge: HOME | End: 2024-01-05
Admitting: FAMILY MEDICINE
Payer: COMMERCIAL

## 2024-01-05 DIAGNOSIS — R10.84 ABDOMINAL PAIN, GENERALIZED: ICD-10-CM

## 2024-01-05 PROCEDURE — 76700 US EXAM ABDOM COMPLETE: CPT

## 2024-03-20 ENCOUNTER — LAB (OUTPATIENT)
Dept: LAB | Facility: HOSPITAL | Age: 44
End: 2024-03-20
Payer: COMMERCIAL

## 2024-03-20 ENCOUNTER — TRANSCRIBE ORDERS (OUTPATIENT)
Dept: ADMINISTRATIVE | Facility: HOSPITAL | Age: 44
End: 2024-03-20
Payer: COMMERCIAL

## 2024-03-20 DIAGNOSIS — K51.819 OTHER ULCERATIVE COLITIS WITH UNSPECIFIED COMPLICATIONS: Primary | ICD-10-CM

## 2024-03-20 DIAGNOSIS — K51.819 OTHER ULCERATIVE COLITIS WITH UNSPECIFIED COMPLICATIONS: ICD-10-CM

## 2024-03-20 PROCEDURE — 83993 ASSAY FOR CALPROTECTIN FECAL: CPT

## 2024-03-25 LAB — CALPROTECTIN STL-MCNT: 456 UG/G (ref 0–120)

## 2024-08-27 ENCOUNTER — TRANSCRIBE ORDERS (OUTPATIENT)
Dept: ADMINISTRATIVE | Facility: HOSPITAL | Age: 44
End: 2024-08-27
Payer: COMMERCIAL

## 2024-08-27 ENCOUNTER — LAB (OUTPATIENT)
Dept: LAB | Facility: HOSPITAL | Age: 44
End: 2024-08-27
Payer: COMMERCIAL

## 2024-08-27 DIAGNOSIS — K51.919 MODERATE CHRONIC ULCERATIVE COLITIS WITH COMPLICATION: Primary | ICD-10-CM

## 2024-08-27 DIAGNOSIS — K51.919 MODERATE CHRONIC ULCERATIVE COLITIS WITH COMPLICATION: ICD-10-CM

## 2024-08-27 LAB
027 TOXIN: NORMAL
ADV 40+41 DNA STL QL NAA+NON-PROBE: NOT DETECTED
ALBUMIN SERPL-MCNC: 4.2 G/DL (ref 3.5–5.2)
ALBUMIN/GLOB SERPL: 1.4 G/DL
ALP SERPL-CCNC: 57 U/L (ref 39–117)
ALT SERPL W P-5'-P-CCNC: 22 U/L (ref 1–33)
ANION GAP SERPL CALCULATED.3IONS-SCNC: 12.4 MMOL/L (ref 5–15)
AST SERPL-CCNC: 19 U/L (ref 1–32)
ASTRO TYP 1-8 RNA STL QL NAA+NON-PROBE: NOT DETECTED
BASOPHILS # BLD AUTO: 0.01 10*3/MM3 (ref 0–0.2)
BASOPHILS NFR BLD AUTO: 0.3 % (ref 0–1.5)
BILIRUB SERPL-MCNC: 0.5 MG/DL (ref 0–1.2)
BUN SERPL-MCNC: 16 MG/DL (ref 6–20)
BUN/CREAT SERPL: 14.8 (ref 7–25)
C CAYETANENSIS DNA STL QL NAA+NON-PROBE: NOT DETECTED
C COLI+JEJ+UPSA DNA STL QL NAA+NON-PROBE: NOT DETECTED
C DIFF TOX GENS STL QL NAA+PROBE: NEGATIVE
CALCIUM SPEC-SCNC: 9.1 MG/DL (ref 8.6–10.5)
CHLORIDE SERPL-SCNC: 102 MMOL/L (ref 98–107)
CO2 SERPL-SCNC: 20.6 MMOL/L (ref 22–29)
CREAT SERPL-MCNC: 1.08 MG/DL (ref 0.57–1)
CRP SERPL-MCNC: 0.8 MG/DL (ref 0–0.5)
CRYPTOSP DNA STL QL NAA+NON-PROBE: NOT DETECTED
DEPRECATED RDW RBC AUTO: 51.2 FL (ref 37–54)
E HISTOLYT DNA STL QL NAA+NON-PROBE: NOT DETECTED
EAEC PAA PLAS AGGR+AATA ST NAA+NON-PRB: NOT DETECTED
EC STX1+STX2 GENES STL QL NAA+NON-PROBE: NOT DETECTED
EGFRCR SERPLBLD CKD-EPI 2021: 65.1 ML/MIN/1.73
EOSINOPHIL # BLD AUTO: 0.01 10*3/MM3 (ref 0–0.4)
EOSINOPHIL NFR BLD AUTO: 0.3 % (ref 0.3–6.2)
EPEC EAE GENE STL QL NAA+NON-PROBE: NOT DETECTED
ERYTHROCYTE [DISTWIDTH] IN BLOOD BY AUTOMATED COUNT: 16.1 % (ref 12.3–15.4)
ETEC LTA+ST1A+ST1B TOX ST NAA+NON-PROBE: NOT DETECTED
G LAMBLIA DNA STL QL NAA+NON-PROBE: NOT DETECTED
GLOBULIN UR ELPH-MCNC: 3 GM/DL
GLUCOSE SERPL-MCNC: 83 MG/DL (ref 65–99)
HCT VFR BLD AUTO: 42 % (ref 34–46.6)
HGB BLD-MCNC: 13.2 G/DL (ref 12–15.9)
IMM GRANULOCYTES # BLD AUTO: 0.01 10*3/MM3 (ref 0–0.05)
IMM GRANULOCYTES NFR BLD AUTO: 0.3 % (ref 0–0.5)
LYMPHOCYTES # BLD AUTO: 0.97 10*3/MM3 (ref 0.7–3.1)
LYMPHOCYTES NFR BLD AUTO: 27.1 % (ref 19.6–45.3)
MCH RBC QN AUTO: 28.1 PG (ref 26.6–33)
MCHC RBC AUTO-ENTMCNC: 31.4 G/DL (ref 31.5–35.7)
MCV RBC AUTO: 89.6 FL (ref 79–97)
MONOCYTES # BLD AUTO: 0.22 10*3/MM3 (ref 0.1–0.9)
MONOCYTES NFR BLD AUTO: 6.1 % (ref 5–12)
NEUTROPHILS NFR BLD AUTO: 2.36 10*3/MM3 (ref 1.7–7)
NEUTROPHILS NFR BLD AUTO: 65.9 % (ref 42.7–76)
NOROVIRUS GI+II RNA STL QL NAA+NON-PROBE: NOT DETECTED
NRBC BLD AUTO-RTO: 0 /100 WBC (ref 0–0.2)
P SHIGELLOIDES DNA STL QL NAA+NON-PROBE: NOT DETECTED
PLATELET # BLD AUTO: 188 10*3/MM3 (ref 140–450)
PMV BLD AUTO: 12.4 FL (ref 6–12)
POTASSIUM SERPL-SCNC: 4.2 MMOL/L (ref 3.5–5.2)
PROT SERPL-MCNC: 7.2 G/DL (ref 6–8.5)
RBC # BLD AUTO: 4.69 10*6/MM3 (ref 3.77–5.28)
RVA RNA STL QL NAA+NON-PROBE: NOT DETECTED
S ENT+BONG DNA STL QL NAA+NON-PROBE: NOT DETECTED
SAPO I+II+IV+V RNA STL QL NAA+NON-PROBE: NOT DETECTED
SHIGELLA SP+EIEC IPAH ST NAA+NON-PROBE: NOT DETECTED
SODIUM SERPL-SCNC: 135 MMOL/L (ref 136–145)
V CHOL+PARA+VUL DNA STL QL NAA+NON-PROBE: NOT DETECTED
V CHOLERAE DNA STL QL NAA+NON-PROBE: NOT DETECTED
WBC NRBC COR # BLD AUTO: 3.58 10*3/MM3 (ref 3.4–10.8)
Y ENTEROCOL DNA STL QL NAA+NON-PROBE: NOT DETECTED

## 2024-08-27 PROCEDURE — 87493 C DIFF AMPLIFIED PROBE: CPT

## 2024-08-27 PROCEDURE — 85025 COMPLETE CBC W/AUTO DIFF WBC: CPT

## 2024-08-27 PROCEDURE — 86140 C-REACTIVE PROTEIN: CPT

## 2024-08-27 PROCEDURE — 87209 SMEAR COMPLEX STAIN: CPT

## 2024-08-27 PROCEDURE — 87507 IADNA-DNA/RNA PROBE TQ 12-25: CPT

## 2024-08-27 PROCEDURE — 36415 COLL VENOUS BLD VENIPUNCTURE: CPT

## 2024-08-27 PROCEDURE — 80053 COMPREHEN METABOLIC PANEL: CPT

## 2024-08-27 PROCEDURE — 87177 OVA AND PARASITES SMEARS: CPT

## 2024-09-04 LAB
O+P SPEC MICRO: NORMAL
O+P STL CONC: NORMAL

## 2025-01-28 ENCOUNTER — TRANSCRIBE ORDERS (OUTPATIENT)
Dept: ADMINISTRATIVE | Facility: HOSPITAL | Age: 45
End: 2025-01-28
Payer: COMMERCIAL

## 2025-01-28 ENCOUNTER — LAB (OUTPATIENT)
Dept: LAB | Facility: HOSPITAL | Age: 45
End: 2025-01-28
Payer: COMMERCIAL

## 2025-01-28 DIAGNOSIS — D72.18: Primary | ICD-10-CM

## 2025-01-28 DIAGNOSIS — K51.819: Primary | ICD-10-CM

## 2025-01-28 PROCEDURE — 83993 ASSAY FOR CALPROTECTIN FECAL: CPT | Performed by: INTERNAL MEDICINE

## 2025-02-03 LAB — CALPROTECTIN STL-MCNT: 35 UG/G (ref 0–120)

## (undated) DEVICE — FRCP BX RADJAW4 NDL 2.8 240CM LG OG BX40

## (undated) DEVICE — Device: Brand: DEFENDO AIR/WATER/SUCTION AND BIOPSY VALVE

## (undated) DEVICE — SYR LUERLOK 30CC

## (undated) DEVICE — GOWN,REINF,POLY,ECL,PP SLV,XL: Brand: MEDLINE

## (undated) DEVICE — Device

## (undated) DEVICE — CONN Y IRR DISP 1P/U

## (undated) DEVICE — ENDOGATOR AUXILIARY WATER JET CONNECTOR: Brand: ENDOGATOR

## (undated) DEVICE — TUBING, SUCTION, 1/4" X 20', STRAIGHT: Brand: MEDLINE INDUSTRIES, INC.

## (undated) DEVICE — SNAR POLYP CAPTIFLX MICRO OVL 13MM 240CM

## (undated) DEVICE — SINGLE PORT MANIFOLD: Brand: NEPTUNE 2

## (undated) DEVICE — ELECTRD RETRN/GRND MEGADYNE SGL/PLT W/CORD 9FT DISP

## (undated) DEVICE — ENDOGATOR TUBING FOR ENDOGATOR EGP-100 IRRIGATION PUMP,OLYMPUS OFP PUMP, OLYMPUS AFU-100 PUMP AND ERBE EIP2 PUMP: Brand: ENDOGATOR

## (undated) DEVICE — POLYP TRAP: Brand: TRAPEASE®

## (undated) DEVICE — THE BITE BLOCK MAXI, LATEX FREE STRAP IS USED TO PROTECT THE ENDOSCOPE INSERTION TUBE FROM BEING BITTEN BY THE PATIENT.